# Patient Record
Sex: FEMALE | Race: WHITE | URBAN - METROPOLITAN AREA
[De-identification: names, ages, dates, MRNs, and addresses within clinical notes are randomized per-mention and may not be internally consistent; named-entity substitution may affect disease eponyms.]

---

## 2017-01-03 NOTE — PROGRESS NOTES
SUBJECTIVE:     CC: Patricia Gage is an 66 year old woman who presents for preventive health visit.     Healthy Habits:    Do you get at least three servings of calcium containing foods daily (dairy, green leafy vegetables, etc.)? yes    Amount of exercise or daily activities, outside of work: 4 day(s) per week    Problems taking medications regularly No    Medication side effects: No    Have you had an eye exam in the past two years? yes    Do you see a dentist twice per year? yes    Do you have sleep apnea, excessive snoring or daytime drowsiness?no        Diabetes Follow-up    Patient is checking blood sugars: 8-10 times daily.  Results are as follows:         170s    Diabetic concerns: None     Symptoms of hypoglycemia (low blood sugar): shaky, dizzy, weak     Paresthesias (numbness or burning in feet) or sores: No     Date of last diabetic eye exam: up to date       Depression and Anxiety Follow-Up    Status since last visit: No change    Other associated symptoms:None    Complicating factors:     Significant life event: No     Current substance abuse: None    PHQ-9 SCORE 2/20/2012   Total Score 1     MAGGIE-7 SCORE 1/9/2015 1/14/2016   Total Score 1 -   Total Score - 4        PHQ-9  English      PHQ-9   Any Language     GAD7       Migraine Follow-Up    Headaches symptoms:  Stable     Frequency: depending on stress     Duration of headaches: depends on pt takes meds    Able to do normal daily activities/work with migraines: Yes    Rescue/Relief medication:aspirin, fioricet              Effectiveness: aspirin minor, Fioricet total relief    Preventative medication: None    Neurologic complications: No new stroke-like symptoms, loss of vision or speech, numbness or weakness    In the past 4 weeks, how often have you gone to Urgent Care or the emergency room because of your headaches?  0     No concerns    Pain in right knee with kneeling on right knee lateral aspect.      DM- following with dr. Lassiter, a1c down  to 6.9.  Having low sugars- lowest 58, can feel them when they happen.  Seeing endo again in feb.      Anxiety:  Well controlled, feels more stressed about work.  Feels that meds are helping, doesn't want to change meds.     Migraine:  Occuring 2-3 x monthly.  meds help.      Today's PHQ-2 Score:   PHQ-2 ( 1999 Pf[employer]) 1/14/2016 1/9/2016   Q1: Little interest or pleasure in doing things 0 -   Q2: Feeling down, depressed or hopeless 0 -   PHQ-2 Score 0 -   Little interest or pleasure in doing things - Not at all   Feeling down, depressed or hopeless - Not at all   PHQ-2 Score - 0       Abuse: Current or Past(Physical, Sexual or Emotional)- NO  Do you feel safe in your environment - YES    Social History   Substance Use Topics     Smoking status: Former Smoker     Smokeless tobacco: Never Used     Alcohol Use: 0.0 oz/week     0 Standard drinks or equivalent per week      Comment: 5 drinks per week     The patient does not drink >3 drinks per day nor >7 drinks per week.    Recent Labs   Lab Test  12/29/16   0726  01/28/16   0716  01/07/15   0705   01/22/14   0701   CHOL  170  175  167   --   153   HDL  75  69  71   --   60   LDL  85  97  87   < >  82   TRIG  51  46  45   --   53   CHOLHDLRATIO   --    --   2.4   --   2.5   NHDL  95  106   --    --    --     < > = values in this interval not displayed.       Reviewed orders with patient.  Reviewed health maintenance and updated orders accordingly - Yes    Mammo Decision Support:  Patient over age 50, mutual decision to screen reflected in health maintenance.    Pertinent mammograms are reviewed under the imaging tab.  History of abnormal Pap smear: NO - age 30-65 PAP every 5 years with negative HPV co-testing recommended  All Histories reviewed and updated in Epic.  Past Medical History   Diagnosis Date     Diabetes mellitus (H)      Myocarditis, unspecified (H) 12/9/2011     at dx of type 1 DM, resolved after     Basal cell carcinoma       Past Surgical History  "  Procedure Laterality Date     Mohs micrographic procedure         ROS:  C: NEGATIVE for fever, chills, change in weight  I: NEGATIVE for worrisome rashes, moles or lesions  E: NEGATIVE for vision changes or irritation  ENT: NEGATIVE for ear, mouth and throat problems  R: NEGATIVE for significant cough or SOB  B: NEGATIVE for masses, tenderness or discharge  CV: NEGATIVE for chest pain, palpitations or peripheral edema  GI: NEGATIVE for nausea, abdominal pain, heartburn, or change in bowel habits  : NEGATIVE for unusual urinary or vaginal symptoms. No vaginal bleeding.  M: NEGATIVE for significant arthralgias or myalgia  N: NEGATIVE for weakness, dizziness or paresthesias  P: NEGATIVE for changes in mood or affect     Problem list, Medication list, Allergies, and Medical/Social/Surgical histories reviewed in [facility] and updated as appropriate.  OBJECTIVE:     /54 mmHg  Pulse 94  Temp(Src) 98.7  F (37.1  C) (Tympanic)  Ht 5' 5\" (1.651 m)  Wt 122 lb 2 oz (55.396 kg)  BMI 20.32 kg/m2  SpO2 97%  EXAM:  GENERAL: healthy, alert and no distress  EYES: Eyes grossly normal to inspection, PERRL and conjunctivae and sclerae normal  HENT: ear canals and TM's normal, nose and mouth without ulcers or lesions  NECK: no adenopathy, no asymmetry, masses, or scars and thyroid normal to palpation  RESP: lungs clear to auscultation - no rales, rhonchi or wheezes  BREAST: normal without masses, tenderness or nipple discharge and no palpable axillary masses or adenopathy  CV: regular rate and rhythm, normal S1 S2, no S3 or S4, no murmur, click or rub, no peripheral edema and peripheral pulses strong  ABDOMEN: soft, nontender, no hepatosplenomegaly, no masses and bowel sounds normal  MS: no gross musculoskeletal defects noted, no edema  SKIN: no suspicious lesions or rashes  NEURO: Normal strength and tone, mentation intact and speech normal  PSYCH: mentation appears normal, affect normal/bright    ASSESSMENT/PLAN:   " "  (Z01.419) Encounter for gynecological examination without abnormal finding  (primary encounter diagnosis)  -pap utd  -mammo utd  -imm utd  -colonoscopy utd    (E10.9) Type 1 diabetes mellitus without complication (H)  -a1c well controlled  -follows with diabetes    (F41.9) Anxiety  -well controlled  -doing well on meds, doesn't wish to change meds  Plan: FLUoxetine (PROZAC) 20 MG capsule, WELLBUTRIN          MG 12 hr tablet          (G43.009) Migraine without aura and without status migrainosus, not intractable  -doing well on fiorcet prn  -no change to medications  Plan: butalbital-acetaminophen-caffeine         (FIORICET/ESGIC) -40 MG per tablet          (K51.50) Left sided ulcerative (chronic) colitis (H)  -in remission  -following with gi     (Z72.0) Tobacco abuse  -still smoking small amounts  -encouraged smoking cessation     COUNSELING:   Reviewed preventive health counseling, as reflected in patient instructions       Regular exercise       Healthy diet/nutrition         reports that she has quit smoking. She has never used smokeless tobacco.    Estimated body mass index is 20.32 kg/(m^2) as calculated from the following:    Height as of this encounter: 5' 5\" (1.651 m).    Weight as of this encounter: 122 lb 2 oz (55.396 kg).       Counseling Resources:  ATP IV Guidelines  Pooled Cohorts Equation Calculator  Breast Cancer Risk Calculator  FRAX Risk Assessment  ICSI Preventive Guidelines  Dietary Guidelines for Americans, 2010  USDA's MyPlate  ASA Prophylaxis  Lung CA Screening    Jenniffer Molina MD  [facility] JOANN  "

## 2017-01-05 ENCOUNTER — OFFICE VISIT (OUTPATIENT)
Dept: PEDIATRICS | Facility: CLINIC | Age: 67
End: 2017-01-05
Payer: COMMERCIAL

## 2017-01-05 VITALS
TEMPERATURE: 98.7 F | OXYGEN SATURATION: 97 % | SYSTOLIC BLOOD PRESSURE: 110 MMHG | HEART RATE: 94 BPM | HEIGHT: 65 IN | DIASTOLIC BLOOD PRESSURE: 54 MMHG | WEIGHT: 122.13 LBS | BODY MASS INDEX: 20.35 KG/M2

## 2017-01-05 DIAGNOSIS — K51.50 LEFT SIDED ULCERATIVE (CHRONIC) COLITIS (H): ICD-10-CM

## 2017-01-05 DIAGNOSIS — G43.009 MIGRAINE WITHOUT AURA AND WITHOUT STATUS MIGRAINOSUS, NOT INTRACTABLE: ICD-10-CM

## 2017-01-05 DIAGNOSIS — F41.9 ANXIETY: ICD-10-CM

## 2017-01-05 DIAGNOSIS — Z72.0 TOBACCO ABUSE: ICD-10-CM

## 2017-01-05 DIAGNOSIS — E10.9 TYPE 1 DIABETES MELLITUS WITHOUT COMPLICATION (H): ICD-10-CM

## 2017-01-05 DIAGNOSIS — Z01.419 ENCOUNTER FOR GYNECOLOGICAL EXAMINATION WITHOUT ABNORMAL FINDING: Primary | ICD-10-CM

## 2017-01-05 PROCEDURE — 99396 PREV VISIT EST AGE 40-64: CPT | Performed by: INTERNAL MEDICINE

## 2017-01-05 RX ORDER — BUTALBITAL, ACETAMINOPHEN AND CAFFEINE 50; 325; 40 MG/1; MG/1; MG/1
1 TABLET ORAL EVERY 4 HOURS PRN
Qty: 30 TABLET | Refills: 5 | Status: SHIPPED | OUTPATIENT
Start: 2017-01-05 | End: 2018-01-11

## 2017-01-05 RX ORDER — BUPROPION HCL 150 MG
150 TABLET,SUSTAINED-RELEASE 12 HR ORAL 2 TIMES DAILY
Qty: 180 TABLET | Refills: 3 | Status: SHIPPED | OUTPATIENT
Start: 2017-01-05 | End: 2017-01-06

## 2017-01-05 ASSESSMENT — PATIENT HEALTH QUESTIONNAIRE - PHQ9: 5. POOR APPETITE OR OVEREATING: SEVERAL DAYS

## 2017-01-05 ASSESSMENT — ANXIETY QUESTIONNAIRES
2. NOT BEING ABLE TO STOP OR CONTROL WORRYING: NOT AT ALL
GAD7 TOTAL SCORE: 3
6. BECOMING EASILY ANNOYED OR IRRITABLE: NOT AT ALL
IF YOU CHECKED OFF ANY PROBLEMS ON THIS QUESTIONNAIRE, HOW DIFFICULT HAVE THESE PROBLEMS MADE IT FOR YOU TO DO YOUR WORK, TAKE CARE OF THINGS AT HOME, OR GET ALONG WITH OTHER PEOPLE: NOT DIFFICULT AT ALL
3. WORRYING TOO MUCH ABOUT DIFFERENT THINGS: SEVERAL DAYS
7. FEELING AFRAID AS IF SOMETHING AWFUL MIGHT HAPPEN: NOT AT ALL
1. FEELING NERVOUS, ANXIOUS, OR ON EDGE: NOT AT ALL
5. BEING SO RESTLESS THAT IT IS HARD TO SIT STILL: SEVERAL DAYS

## 2017-01-05 NOTE — NURSING NOTE
"Chief Complaint   Patient presents with     Physical       Initial /54 mmHg  Pulse 94  Temp(Src) 98.7  F (37.1  C) (Tympanic)  Ht 5' 5\" (1.651 m)  Wt 122 lb 2 oz (55.396 kg)  BMI 20.32 kg/m2  SpO2 97% Estimated body mass index is 20.32 kg/(m^2) as calculated from the following:    Height as of this encounter: 5' 5\" (1.651 m).    Weight as of this encounter: 122 lb 2 oz (55.396 kg).  BP completed using cuff size: regular  Giovanna ANIKET Jama      "

## 2017-01-05 NOTE — MR AVS SNAPSHOT
After Visit Summary   1/5/2017    Patricia Gage    MRN: [id number]           Patient Information     Date Of Birth          1950        Visit Information        Provider Department      1/5/2017 7:00 AM Jenniffer Molina MD [facility]        Today's Diagnoses     Encounter for gynecological examination without abnormal finding    -  1     Type 1 diabetes mellitus without complication (H)         Anxiety         Migraine without aura and without status migrainosus, not intractable         Left sided ulcerative (chronic) colitis (H)         Tobacco abuse           Care Instructions      Preventive Health Recommendations  Female Ages 50 - 64    Yearly exam: See your health care provider every year in order to  o Review health changes.   o Discuss preventive care.    o Review your medicines if your doctor has prescribed any.      Get a Pap test every three years (unless you have an abnormal result and your provider advises testing more often).    If you get Pap tests with HPV test, you only need to test every 5 years, unless you have an abnormal result.     You do not need a Pap test if your uterus was removed (hysterectomy) and you have not had cancer.    You should be tested each year for STDs (sexually transmitted diseases) if you're at risk.     Have a mammogram every 1 to 2 years.    Have a colonoscopy at age 50, or have a yearly FIT test (stool test). These exams screen for colon cancer.      Have a cholesterol test every 5 years, or more often if advised.    Have a diabetes test (fasting glucose) every three years. If you are at risk for diabetes, you should have this test more often.     If you are at risk for osteoporosis (brittle bone disease), think about having a bone density scan (DEXA).    Shots: Get a flu shot each year. Get a tetanus shot every 10 years.    Nutrition:     Eat at least 5 servings of fruits and vegetables each day.    Eat whole-grain bread,  whole-wheat pasta and brown rice instead of white grains and rice.    Talk to your provider about Calcium and Vitamin D.     Lifestyle    Exercise at least 150 minutes a week (30 minutes a day, 5 days a week). This will help you control your weight and prevent disease.    Limit alcohol to one drink per day.    No smoking.     Wear sunscreen to prevent skin cancer.     See your dentist every six months for an exam and cleaning.    See your eye doctor every 1 to 2 years.            Follow-ups after your visit        Your next 10 appointments already scheduled     Feb 07, 2017  4:00 PM   Return Visit with Tania Lang MD   [facility] ([facility])    [address] [place] [address]  [address]  [facility] [address] [phone]              Who to contact     If you have questions or need follow up information about today's clinic visit or your schedule please contact [facility] directly at [phone].  Normal or non-critical lab and imaging results will be communicated to you by SocialOptimizrhart, letter or phone within 4 business days after the clinic has received the results. If you do not hear from us within 7 days, please contact the clinic through [employer] or phone. If you have a critical or abnormal lab result, we will notify you by phone as soon as possible.  Submit refill requests through Vyykn or call your pharmacy and they will forward the refill request to us. Please allow 3 business days for your refill to be completed.          Additional Information About Your Visit        [employer] Information     Vyykn gives you secure access to your electronic health record. If you see a primary care provider, you can also send messages to your care team and make appointments. If you have questions, please call your primary care clinic.  If you do not have a primary care provider, please call [phone] and they will assist you.        Care EveryWhere ID     This is your Care  "EveryWhere ID. This could be used by other organizations to access your [facility] medical records  [id number]        Your Vitals Were     Pulse Temperature Height BMI (Body Mass Index) Pulse Oximetry       94 98.7  F (37.1  C) (Tympanic) 5' 5\" (1.651 m) 20.32 kg/m2 97%        Blood Pressure from Last 3 Encounters:   01/05/17 110/54   11/10/16 126/66   04/18/16 102/65    Weight from Last 3 Encounters:   01/05/17 122 lb 2 oz (55.396 kg)   11/10/16 122 lb 1 oz (55.367 kg)   04/18/16 118 lb (53.524 kg)              Today, you had the following     No orders found for display         Where to get your medicines      These medications were sent to [facility] [place] [address] [facility] [place]  [address] [facility] [place] [address]     Phone:  [phone]    - FLUoxetine 20 MG capsule  - WELLBUTRIN  MG 12 hr tablet      Some of these will need a paper prescription and others can be bought over the counter.  Ask your nurse if you have questions.     Bring a paper prescription for each of these medications    - butalbital-acetaminophen-caffeine -40 MG per tablet       Primary Care Provider Office Phone # Fax #    Jenniffer Molina -[phone]       [facility] [address] [place] [address]        Thank you!     Thank you for choosing [facility]  for your care. Our goal is always to provide you with excellent care. Hearing back from our patients is one way we can continue to improve our services. Please take a few minutes to complete the written survey that you may receive in the mail after your visit with us. Thank you!             Your Updated Medication List - Protect others around you: Learn how to safely use, store and throw away your medicines at www.disposemymeds.org.          This list is accurate as of: 1/5/17  7:40 AM.  Always use your most recent med list.                   Brand Name Dispense Instructions for use    ASACOL  MG " EC tablet   Generic drug:  mesalamine     180 tablet    3 tablets twice daily       aspirin 81 MG EC tablet     90 tablet    Take 1 tablet by mouth daily.       atorvastatin 40 MG tablet    LIPITOR    90 tablet    Take 1 tablet (40 mg) by mouth daily       blood glucose monitoring test strip    no brand specified     by In Vitro route 4 times daily.       butalbital-acetaminophen-caffeine -40 MG per tablet    FIORICET/ESGIC    30 tablet    Take 1 tablet by mouth every 4 hours as needed       estradiol 0.1 MG/GM cream    ESTRACE VAGINAL    30 g    Place 2 g vaginally twice a week       fluconazole 150 MG tablet    DIFLUCAN    12 tablet    Take 1 tablet (150 mg) by mouth every 7 days       FLUoxetine 20 MG capsule    PROzac    90 capsule    Take 1 capsule (20 mg) by mouth daily       insulin infusion pump device      100 units/carb unit. 50 units SQ continuous       lisinopril 5 MG tablet    PRINIVIL/ZESTRIL    45 tablet    Take 0.5 tablets (2.5 mg) by mouth daily       * nicotine 14 MG/24HR 24 hr patch    NICODERM CQ    30 patch    Place 1 patch onto the skin every 24 hours       * nicotine 7 MG/24HR 24 hr patch    NICODERM CQ    30 patch    Place 1 patch onto the skin every 24 hours       WELLBUTRIN  MG 12 hr tablet   Generic drug:  buPROPion     180 tablet    Take 1 tablet (150 mg) by mouth 2 times daily       * Notice:  This list has 2 medication(s) that are the same as other medications prescribed for you. Read the directions carefully, and ask your doctor or other care provider to review them with you.

## 2017-01-06 ENCOUNTER — TELEPHONE (OUTPATIENT)
Dept: PEDIATRICS | Facility: CLINIC | Age: 67
End: 2017-01-06

## 2017-01-06 DIAGNOSIS — F41.9 ANXIETY: Primary | ICD-10-CM

## 2017-01-06 RX ORDER — BUPROPION HYDROCHLORIDE 150 MG/1
150 TABLET, EXTENDED RELEASE ORAL 2 TIMES DAILY
Qty: 180 TABLET | Refills: 3
Start: 2017-01-06 | End: 2018-01-11

## 2017-01-06 ASSESSMENT — ANXIETY QUESTIONNAIRES: GAD7 TOTAL SCORE: 3

## 2017-01-06 NOTE — TELEPHONE ENCOUNTER
Pharmacy calling that Wellbutrin states ARIANNE but patient states that it should be generic. Spoke with Dr. Molina and she ok'd for generic.

## 2017-01-24 ENCOUNTER — MYC MEDICAL ADVICE (OUTPATIENT)
Dept: PEDIATRICS | Facility: CLINIC | Age: 67
End: 2017-01-24

## 2017-01-24 DIAGNOSIS — K51.50 LEFT SIDED ULCERATIVE (CHRONIC) COLITIS (H): Primary | ICD-10-CM

## 2017-02-07 ENCOUNTER — OFFICE VISIT (OUTPATIENT)
Dept: ENDOCRINOLOGY | Facility: CLINIC | Age: 67
End: 2017-02-07
Payer: COMMERCIAL

## 2017-02-07 ENCOUNTER — TELEPHONE (OUTPATIENT)
Dept: EDUCATION SERVICES | Facility: CLINIC | Age: 67
End: 2017-02-07

## 2017-02-07 VITALS
OXYGEN SATURATION: 98 % | HEART RATE: 68 BPM | WEIGHT: 136 LBS | BODY MASS INDEX: 22.63 KG/M2 | DIASTOLIC BLOOD PRESSURE: 60 MMHG | SYSTOLIC BLOOD PRESSURE: 128 MMHG

## 2017-02-07 DIAGNOSIS — E10.9 TYPE 1 DIABETES MELLITUS WITHOUT COMPLICATION (H): Primary | ICD-10-CM

## 2017-02-07 PROCEDURE — 99214 OFFICE O/P EST MOD 30 MIN: CPT | Performed by: INTERNAL MEDICINE

## 2017-02-07 RX ORDER — SUBCUTANEOUS INSULIN PUMP
EACH MISCELLANEOUS
Status: CANCELLED | OUTPATIENT
Start: 2017-02-07

## 2017-02-07 NOTE — MR AVS SNAPSHOT
After Visit Summary   2017    Patricia Gage    MRN: [id number]           Patient Information     Date Of Birth          1950        Visit Information        Provider Department      2017 4:00 PM Tania Lang MD [facility]        Today's Diagnoses     Type 1 diabetes mellitus without complication (H)    -  1       Care Instructions    [facility]   Dr Lang, Endocrinology Department      [facility]   [address] [place] [address] [place] [address]  Appointment [phone]  Fax: [phone]   Monday and Tuesday         [facility]   [address] [place] [address]  Appointment [phone]  Fax: [phone]  Wednesday and Thursday           Labs in 3 months  Please make a lab appointment for blood work and follow up clinic appointment in 1 week after that to discuss results.    Pump settings changes as discussed.        Follow-ups after your visit        Your next 10 appointments already scheduled     2017  8:30 AM   Diabetic Education with [place] DIABETIC ED RESOURCE   [facility]    [address]  [facility] [id number] [phone]            Mar 14, 2017  3:45 PM   New Visit with Emiliana Doshi MD   [facility]    [address] [place] [address]  [address]  [facility] [address] [phone]              Who to contact     If you have questions or need follow up information about today's clinic visit or your schedule please contact [facility] directly at [phone].  Normal or non-critical lab and imaging results will be communicated to you by MyChart, letter or phone within 4 business days after the clinic has received the results. If you do not hear from us within 7 days, please contact the clinic through MyChart or phone. If  you have a critical or abnormal lab result, we will notify you by phone as soon as possible.  Submit refill requests through [employer]Lynes or call your pharmacy and they will forward the refill request to us. Please allow 3 business days for your refill to be completed.          Additional Information About Your Visit        [employer] Information     TimeLynes gives you secure access to your electronic health record. If you see a primary care provider, you can also send messages to your care team and make appointments. If you have questions, please call your primary care clinic.  If you do not have a primary care provider, please call [phone] and they will assist you.        Care EveryWhere ID     This is your Care EveryWhere ID. This could be used by other organizations to access your [facility] medical records  [id number]        Your Vitals Were     Pulse Pulse Oximetry                68 98%           Blood Pressure from Last 3 Encounters:   02/07/17 128/60   01/05/17 110/54   11/10/16 126/66    Weight from Last 3 Encounters:   02/07/17 136 lb (61.689 kg)   01/05/17 122 lb 2 oz (55.396 kg)   11/10/16 122 lb 1 oz (55.367 kg)              Today, you had the following     No orders found for display         Today's Medication Changes          These changes are accurate as of: 2/7/17  4:42 PM.  If you have any questions, ask your nurse or doctor.               Start taking these medicines.        Dose/Directions    insulin aspart 100 UNITS/ML injection   Commonly known as:  NovoLOG VIAL   Used for:  Type 1 diabetes mellitus without complication (H)   Started by:  Tania Lang MD        About 35 units/day in insulin pump   Quantity:  30 mL   Refills:  3         These medicines have changed or have updated prescriptions.        Dose/Directions    blood glucose monitoring test strip   Commonly known as:  no brand specified   This may have changed:    - how to take this  - when to take this  - additional instructions    Used for:  Type 1 diabetes mellitus without complication (H)   Changed by:  Tania Lang MD        Please dispense glucometer compatible strips. Check 8 times a day   Quantity:  300 each   Refills:  3            Where to get your medicines      These medications were sent to [facility] [place] [address] [facility]  [address] [address] [facility] [address] [address]     Phone:  [phone]    - blood glucose monitoring test strip  - insulin aspart 100 UNITS/ML injection             Primary Care Provider Office Phone # Fax #    Jenniffer Molina [phone]       [facility] [address] [place] [address]        Thank you!     Thank you for choosing [facility]  for your care. Our goal is always to provide you with excellent care. Hearing back from our patients is one way we can continue to improve our services. Please take a few minutes to complete the written survey that you may receive in the mail after your visit with us. Thank you!             Your Updated Medication List - Protect others around you: Learn how to safely use, store and throw away your medicines at www.disposemymeds.org.          This list is accurate as of: 2/7/17  4:42 PM.  Always use your most recent med list.                   Brand Name Dispense Instructions for use    ASACOL  MG EC tablet   Generic drug:  mesalamine     180 tablet    3 tablets twice daily       aspirin 81 MG EC tablet     90 tablet    Take 1 tablet by mouth daily.       atorvastatin 40 MG tablet    LIPITOR    90 tablet    Take 1 tablet (40 mg) by mouth daily       blood glucose monitoring test strip    no brand specified    300 each    Please dispense glucometer compatible strips. Check 8 times a day       buPROPion 150 MG 12 hr tablet    WELLBUTRIN SR    180 tablet    Take 1 tablet (150 mg) by mouth 2 times daily       butalbital-acetaminophen-caffeine -40 MG per tablet    FIORICET/ESGIC    30  tablet    Take 1 tablet by mouth every 4 hours as needed       estradiol 0.1 MG/GM cream    ESTRACE VAGINAL    30 g    Place 2 g vaginally twice a week       fluconazole 150 MG tablet    DIFLUCAN    12 tablet    Take 1 tablet (150 mg) by mouth every 7 days       FLUoxetine 20 MG capsule    PROzac    90 capsule    Take 1 capsule (20 mg) by mouth daily       insulin aspart 100 UNITS/ML injection    NovoLOG VIAL    30 mL    About 35 units/day in insulin pump       insulin infusion pump device      100 units/carb unit. 50 units SQ continuous       lisinopril 5 MG tablet    PRINIVIL/ZESTRIL    45 tablet    Take 0.5 tablets (2.5 mg) by mouth daily       * nicotine 14 MG/24HR 24 hr patch    NICODERM CQ    30 patch    Place 1 patch onto the skin every 24 hours       * nicotine 7 MG/24HR 24 hr patch    NICODERM CQ    30 patch    Place 1 patch onto the skin every 24 hours       * Notice:  This list has 2 medication(s) that are the same as other medications prescribed for you. Read the directions carefully, and ask your doctor or other care provider to review them with you.

## 2017-02-07 NOTE — PATIENT INSTRUCTIONS
[facility]   Dr Lang, Endocrinology Department      [facility]   [address] [address]  Appointment [phone]  Fax: [phone]   Monday and Tuesday         [place] [address] [place] [address]  Appointment [phone]  Fax: [phone]  Wednesday and Thursday           Labs in 3 months  Please make a lab appointment for blood work and follow up clinic appointment in 1 week after that to discuss results.    Pump settings changes as discussed.

## 2017-02-07 NOTE — PROGRESS NOTES
"Chief Complaint   Patient presents with     Diabetes       Initial /60 mmHg  Pulse 68  Wt 136 lb (61.689 kg)  SpO2 98% Estimated body mass index is 22.63 kg/(m^2) as calculated from the following:    Height as of 1/5/17: 5' 5\" (1.651 m).    Weight as of this encounter: 136 lb (61.689 kg).  BP completed using cuff size: regular      Dominique Johsnon MA    "

## 2017-02-07 NOTE — TELEPHONE ENCOUNTER
Patient is scheduled to see diabetes education on 2/9/17, however does not currently have a referral in place. Please fill out and sign pended order if you are in agreement with this appointment.    Thanks!    Lorraine Babin RD LD CDE

## 2017-02-08 ENCOUNTER — TELEPHONE (OUTPATIENT)
Dept: EDUCATION SERVICES | Facility: CLINIC | Age: 67
End: 2017-02-08

## 2017-02-08 DIAGNOSIS — E10.9 TYPE 1 DIABETES MELLITUS WITHOUT COMPLICATION (H): Primary | ICD-10-CM

## 2017-02-08 NOTE — TELEPHONE ENCOUNTER
Patient is scheduled to see diabetes education 2/9/17, however does not have a current referral in place. Please sign pended order if you are in agreement with this appointment.    Lorraine Babin RD LD CDE

## 2017-02-08 NOTE — PROGRESS NOTES
Name: Patricia Gage  Seen for Diabetes.  BERTIN .  Last visit with her 4/2016  HPI:  Patricia Gage is a 65 year old female who presents for the evaluation/management of type 1 diabetes.  A1C      6.9   12/29/2016  A1C      7.5   11/14/2016  Will be switching to new pump.     1. Type 1 DM:  Orginally diagnosed at the age of 60 ( 10/2011).  DKA at diagnosis, sick at the time with vomiting and disorientation,, glucose of 800.Started on lantus and novolog, switched to insulin pump therapy in 2/12  Current Regimen:Humalog via Minimed Paradigm Revel 523 insulin pump  BS checks:8-10 per day  Average Meter Download: 184. BG variable. FBgs mostly OK. BG tend to rise during day. be  Followed with Dr Stover , switching due to insurance change. Doing well, comfortable with pump, though due for upgrade,has had current pump for 4 years.  H/o Ulcerative colitis, former smoker.  Here for f/u, doing well.    Current Regimen: mostly in standard pattern. On days when she has wine she switch to pattern A and when on prednisone for UC switch to pattern B.  Insulin pump -   Time Rate (U/hr)   7454-2642 0.475   7611-5758 0.850   8445-3084 0.900   7514-8575 0.775   9096-1140 0.050   5612-7412 0.300     Carbohydrate Ratio -    Time Ratio   0517-0157 14   6961-3687 12   2119-0678 16   8687-2291 15     Sensitivity   58   Active Insulin Time  4 hours   Basal  44 %   Bolus   55 %   Total Carbohydrates/day  179   Total Insulin/day   25   Average Blood Sugar  168   BS Checks 9 times a day   Care Link Username-   Password-         Complications:   Diabetes Complications  Description / Detail    Diabetic Retinopathy  No   CAD / PAD  No   Neuropathy  No   Nephropathy / Microalbuminuria  No   Gastroparesis  No   Hypoglycemia [place]  No     2. Hypertension: Blood Pressure today:   BP Readings from Last 3 Encounters:   02/07/17 128/60   01/05/17 110/54   11/10/16 126/66    Takes medications everyday without forgetting a dose.  Denies feeling  lightheaded or dizzy.  Wakes up 0 times a night to urinate.  3. Hyperlipidemia:  Denies muscle aches of pains.       PMH/PSH:  Past Medical History   Diagnosis Date     Diabetes mellitus (H)      Myocarditis, unspecified (H) 2011     at dx of type 1 DM, resolved after     Basal cell carcinoma      Past Surgical History   Procedure Laterality Date     Mohs micrographic procedure       Family Hx:  Family History   Problem Relation Age of Onset     Cardiovascular Father      father  of heart failure     CANCER No family hx of      no skin cancer     Other - See Comments Sister      Tourettes     GASTROINTESTINAL DISEASE Sister      Celiac Sprue       Social Hx:  Social History     Social History     Marital Status:      Spouse Name: N/A     Number of Children: N/A     Years of Education: N/A     Occupational History     Not on file.     Social History Main Topics     Smoking status: Former Smoker     Smokeless tobacco: Never Used     Alcohol Use: 0.0 oz/week     0 Standard drinks or equivalent per week      Comment: 5 drinks per week     Drug Use: No     Sexual Activity:     Partners: Male     Birth Control/ Protection: Surgical     Other Topics Concern     Not on file     Social History Narrative          MEDICATIONS:  has a current medication list which includes the following prescription(s): blood glucose monitoring, insulin aspart, bupropion, fluoxetine, butalbital-acetaminophen-caffeine, nicotine, nicotine, lisinopril, atorvastatin, estradiol, fluconazole, mesalamine, insulin infusion pump, and aspirin.    ROS     ROS: 10 point ROS neg other than the symptoms noted above in the HPI.    Physical Exam   VS: /60 mmHg  Pulse 68  Wt 136 lb (61.689 kg)  SpO2 98%  GENERAL: AXOX3, NAD, well dressed, answering questions appropriately, appears stated age.  HEENT: OP clear, no LAD, no TM, non-tender, no exopthalmous, no proptosis, EOMI, no lig lag, no retraction  NECK: palpable thyroid, no  nodules.  CV: RRR, no rubs, gallops, no murmurs  LUNGS: CTAB, no wheezes, rales, or ronchi  ABDOMEN: soft, nontender, nondistended, +BS, no organomegaly  EXTREMITIES: no edema, +pulses, no rashes, no lesions  NEUROLOGY: CN grossly intact, + DTR upper and lower extremity, no tremors  MSK: grossly intact  SKIN: no rashes, no lesions    LABS:  A1c:  A1C      6.9   12/29/2016  A1C      7.5   11/14/2016  A1C      7.6   4/18/2016  A1C      7.1   1/12/2016  A1C      7.6   10/14/2015      BMP:  Diabetic Latest Ref Rng 1/28/2016   Glucose 70 - 99 mg/dL 136 (H)   Potassium 3.4 - 5.3 mmol/L 3.8   Sodium 133 - 144 mmol/L 140   CREATININE 0.52 - 1.04 mg/dL 0.48 (L)     Urine Micro:  Diabetic Latest Ref Rng 1/12/2016   Microalb/creat urine 0 - 25 mg/g Cr 7.30     Diabetic Latest Ref Rng 1/7/2015   Microalb/creat urine 0 - 25 mg/g Cr 7.46       LFTs/Lipids:  Diabetic Latest Ref Rng 1/28/2016   AST (liver test) 0 - 45 U/L 16   ALT (liver test) 0 - 50 U/L 22   Cholesterol <200 mg/dL 175   Triglyceride (fat in blood) <150 mg/dL 46   HDL (>45) - good chol >49 mg/dL 69   LDL (<100) - bad chol <100 mg/dL 97           Blood Glucose Meter reviewed.     All pertinent notes, labs, and images personally reviewed by me.     A/P  Ms.Ann ADAN Gage is a 65 year old here for the evaluation/management of diabetes:    1. DM1 - Under Fair control. No known complications.  A1c 6.9 %. BG imroving. No major episodes of hypoglycemia. Tend to rise after dinner.  Basal < bolus.  Recommend checking blood sugars before meals and 2 hours after meals.  Urine microalbumin: wnl  Labs in 3 months  Discussed hypoglycemia signs and symptoms as well as management in detail.  If Blood glucose are low more often-> 2-3 times/week- give us a call.      Insulin pump - changes today    Time Rate (U/hr)   2344-7363 0.475   2882-8206 0.850   0766-2517 0.90-- 0.850   5757-7905 0.775-- 0.825   2961-6148 0.050--0.10   6304-8423 0.300--0.40     Carbohydrate Ratio -    Time  Ratio   4841-6250 14   2153-8811 12   3098-3507 16--15   5666-9729 15--13       Sensitivity   58      Prevention  Flu Shot-9/15  Pneumovax- 1/15  Opthalmology-Yes, 4/16  Dental-Yes  ASA-Yes  Smoking- No, former smoker    2. Hypertension - Under Good  control. Blood pressure medications include lisinopril 5 mg taking for renal protection. Continue current medical therapy.    3. Hyperlipidemia - Under Good control. ON  lipitor 40 mg.  -- recheck lipid panel in next visit    4. Other.  Patients with Type 1 DM are at risk for other autoimmune diseases.  She is at increase risk for thyroid disorder by 20% and increase risk for Celiac Sprue by 5-10%. TFT' are normal. Celiac sprue  Testing: negative.    Meds ordered today:   Orders Placed This Encounter   Medications     blood glucose monitoring (NO BRAND SPECIFIED) test strip     Sig: Please dispense glucometer compatible strips. Check 8 times a day     Dispense:  300 each     Refill:  3     insulin aspart (NOVOLOG VIAL) 100 UNITS/ML injection     Sig: About 35 units/day in insulin pump     Dispense:  30 mL     Refill:  3       Medications Discontinued During This Encounter   Medication Reason     glucose blood VI test strips strip Reorder       Follow-up:  follow up in 3 month(s)    Tania Lang MD  Endocrinology   [facility]  CC: Jenniffer Molina    More than 50% of face to face time spent with Ms. Gage on counseling / coordinating her care.  Total face to face time was greater than 25 minutes.     All questions were answered.  The patient indicates understanding of the above issues and agrees with the plan set forth.

## 2017-02-09 ENCOUNTER — ALLIED HEALTH/NURSE VISIT (OUTPATIENT)
Dept: EDUCATION SERVICES | Facility: CLINIC | Age: 67
End: 2017-02-09
Payer: COMMERCIAL

## 2017-02-09 DIAGNOSIS — E10.9 TYPE 1 DIABETES MELLITUS WITHOUT COMPLICATION (H): Primary | ICD-10-CM

## 2017-02-09 PROCEDURE — G0108 DIAB MANAGE TRN  PER INDIV: HCPCS

## 2017-02-09 NOTE — PROGRESS NOTES
Diabetes Self Management Training: Insulin Pump Start (Upgrade Training)    SUBJECTIVE:  Patient presents for an upgrade insulin pump start.   Pump Type: Medtronic 630G    OBJECTIVE:  Vitals: There were no vitals taken for this visit.    Blood sugar at the end of pump start appointment: 143 mg/dL      ASSESSMENT / INTERVENTION:  Patient instructed on basic insulin pump topics and insulin pump programming.     Insulin pump was programmed according to the Pump Start Orders signed by MD and attached to this encounter: BG Targets: Overnight 90 - 120 mg/dL from 9 pm - 5 am and Daytime 85 - 130 mg/dL from 5 am - 9 pm  Active Insulin Time: 4 hours  Dual wave/Square wave Bolus: off  Time  Rate (U/hr)    7310-8868  0.475    2867-9199  0.850    2072-7780  0.850    2291-3448  0.825    1515-0575  0.10    7474-5598  0.40      Carbohydrate Ratio -    Time  Ratio    5985-0030  14    9615-3319  12    3728-8171  15    8027-1948  13          Sensitivity    58                Infusion set: Medtronic Silhouette    Problem Solving: high blood sugars and DKA prevention  when to call the pump company help line     Education materials provided to patient: patient has the Medtronic 630G pump user learning guides    FOLLOW-UP:  Follow-up to start sensor in 1-2 weeks.    ZI Navas CDE    Time Spent: 60  Visit Type: Individual

## 2017-02-17 ENCOUNTER — MYC MEDICAL ADVICE (OUTPATIENT)
Dept: ENDOCRINOLOGY | Facility: CLINIC | Age: 67
End: 2017-02-17

## 2017-02-17 DIAGNOSIS — E10.9 TYPE 1 DIABETES MELLITUS WITHOUT COMPLICATION (H): Primary | ICD-10-CM

## 2017-02-21 NOTE — TELEPHONE ENCOUNTER
Can you please see how can we order this? Is it paperwork or through e-prescribe? Or through medtronic?  In  order we need to put exact details in orders.  Thank you.

## 2017-02-22 RX ORDER — INSULIN PUMP SYRINGE, 1.8 ML
1 EACH MISCELLANEOUS DAILY PRN
Qty: 30 EACH | Refills: 1 | Status: SHIPPED | OUTPATIENT
Start: 2017-02-22

## 2017-02-22 NOTE — TELEPHONE ENCOUNTER
Order pending for insulin pump supplies.  Routed to Dr. Lang for signature.    Yasmeen Weller RD LD CDE

## 2017-02-27 ENCOUNTER — MYC MEDICAL ADVICE (OUTPATIENT)
Dept: PEDIATRICS | Facility: CLINIC | Age: 67
End: 2017-02-27

## 2017-02-27 DIAGNOSIS — E10.9 TYPE 1 DIABETES MELLITUS WITHOUT COMPLICATION (H): ICD-10-CM

## 2017-02-27 RX ORDER — LISINOPRIL 5 MG/1
2.5 TABLET ORAL DAILY
Qty: 45 TABLET | Refills: 3 | Status: SHIPPED | OUTPATIENT
Start: 2017-02-27 | End: 2018-01-11

## 2017-02-27 NOTE — TELEPHONE ENCOUNTER
Lisinopril 5 mg      Last Written Prescription Date: 04/04/16  Last Fill Quantity: 45, # refills: 2  Last Office Visit with [facility] or [facility] prescribing provider: 01/05/17  Associated diagnosis is diabetes.  Patient doesn't have hypertension on her problem list.     Potassium   Date Value Ref Range Status   12/29/2016 4.0 3.4 - 5.3 mmol/L Final     Creatinine   Date Value Ref Range Status   12/29/2016 0.55 0.52 - 1.04 mg/dL Final     BP Readings from Last 3 Encounters:   02/07/17 128/60   01/05/17 110/54   11/10/16 126/66   Routing refill request to provider for review/approval because:  Due to related diagnosis.  DAO Ford RN

## 2017-03-09 ENCOUNTER — MYC MEDICAL ADVICE (OUTPATIENT)
Dept: ENDOCRINOLOGY | Facility: CLINIC | Age: 67
End: 2017-03-09

## 2017-03-09 DIAGNOSIS — E10.9 TYPE 1 DIABETES MELLITUS WITHOUT COMPLICATION (H): Primary | ICD-10-CM

## 2017-03-09 NOTE — TELEPHONE ENCOUNTER
Can try PA. If not approved then needs a new meter.  Did we get paperwork earlier as she is reporting in mail?

## 2017-03-13 DIAGNOSIS — Z72.0 TOBACCO ABUSE: ICD-10-CM

## 2017-03-13 NOTE — TELEPHONE ENCOUNTER
NICOTINE 14MG/24H PATCH     Last Written Prescription Date: 6/15/2016  Last Fill Quantity: 30, # refills: 3  Last Office Visit with [facility], [facility] or [facility] prescribing provider: 1/5/2017        BP Readings from Last 3 Encounters:   02/07/17 128/60   01/05/17 110/54   11/10/16 126/66

## 2017-03-14 ENCOUNTER — TELEPHONE (OUTPATIENT)
Dept: ENDOCRINOLOGY | Facility: CLINIC | Age: 67
End: 2017-03-14

## 2017-03-14 NOTE — TELEPHONE ENCOUNTER
There's a telephone encounter on 3/9 for this, Lanny is working on this per PA rep at [facility].Giovanna Jama, CMA

## 2017-03-16 RX ORDER — NICOTINE 21 MG/24HR
1 PATCH, TRANSDERMAL 24 HOURS TRANSDERMAL EVERY 24 HOURS
Qty: 30 PATCH | Refills: 3 | Status: SHIPPED | OUTPATIENT
Start: 2017-03-16

## 2017-03-16 NOTE — TELEPHONE ENCOUNTER
Patient is ok with doing Formulary Exception, she recalls needing to do this every year.     Please keep an eye on the result for this. If needing, ok to call insurance number below to follow up on status. I did temitope it urgent so it should result within 72 hours. Patient will also call her insurance.

## 2017-03-16 NOTE — TELEPHONE ENCOUNTER
I apologize patient plans to get a new meter, but through Medtronic not through a local pharmacy. She is needing these supplies in the interim.      Called [facility], we would need to fill out a Formulary Exception Form, which cannot be done over the phone.     I'm unsure if the patient would want to switch now or if she'd like to go ahead with the Formulary Exception Form, so I will complete this and fax back to the insurance. I will keep a copy in Dr. Lang's [facility] here at [place] on her desk.     I did leave a voicemail updating the patient and asked her to call back if she'd like to get a new meter & supplies now.     **Please keep encounter open in Endo Pool to follow up to see if the patient is switching over or wanting formulary exception. Patient likely will need a whole new meter versus just test strips, so we may need to send a meter if patient is wanting to switch now.

## 2017-03-16 NOTE — TELEPHONE ENCOUNTER
Patient calling, [facility] did not receive the PA info from us. She is going to run out of test strips soon. Classiqs Contour Strips.     Routing to [employer]. Can we call insurance to do PA over the phone asap?     Ins# [phone]  ID# [id number]    Alyssa Rodriguez RN  [facility]

## 2017-03-17 NOTE — TELEPHONE ENCOUNTER
When I called [employer] I was told a formulary exception would be denied - I was told switching meters and testing strips would allow her scripts to be on the formulary.     Blanca Baeza CMA    3/17/2017  6:55 AM

## 2017-03-27 ENCOUNTER — TRANSFERRED RECORDS (OUTPATIENT)
Dept: HEALTH INFORMATION MANAGEMENT | Facility: CLINIC | Age: 67
End: 2017-03-27

## 2017-03-31 ENCOUNTER — TRANSFERRED RECORDS (OUTPATIENT)
Dept: HEALTH INFORMATION MANAGEMENT | Facility: CLINIC | Age: 67
End: 2017-03-31

## 2017-04-04 ENCOUNTER — TELEPHONE (OUTPATIENT)
Dept: DERMATOLOGY | Facility: CLINIC | Age: 67
End: 2017-04-04

## 2017-04-04 NOTE — TELEPHONE ENCOUNTER
LVM to call clinic. Please help patient reschedule her 5/16 appointment with Dr Doshi.    Brooklynn Stevenson,   [facility]

## 2017-05-08 ENCOUNTER — OFFICE VISIT (OUTPATIENT)
Dept: ENDOCRINOLOGY | Facility: CLINIC | Age: 67
End: 2017-05-08
Payer: COMMERCIAL

## 2017-05-08 VITALS
DIASTOLIC BLOOD PRESSURE: 70 MMHG | HEIGHT: 65 IN | SYSTOLIC BLOOD PRESSURE: 110 MMHG | WEIGHT: 121 LBS | BODY MASS INDEX: 20.16 KG/M2

## 2017-05-08 DIAGNOSIS — Z79.4 LONG TERM CURRENT USE OF INSULIN (H): ICD-10-CM

## 2017-05-08 DIAGNOSIS — E10.9 TYPE 1 DIABETES MELLITUS WITHOUT COMPLICATION (H): Primary | ICD-10-CM

## 2017-05-08 LAB — HBA1C MFR BLD: 7.1 % (ref 4.3–6)

## 2017-05-08 PROCEDURE — 83036 HEMOGLOBIN GLYCOSYLATED A1C: CPT | Performed by: INTERNAL MEDICINE

## 2017-05-08 PROCEDURE — 99214 OFFICE O/P EST MOD 30 MIN: CPT | Performed by: INTERNAL MEDICINE

## 2017-05-08 NOTE — MR AVS SNAPSHOT
After Visit Summary   2017    Patricia Gage    MRN: [id number]           Patient Information     Date Of Birth          1950        Visit Information        Provider Department      2017 4:00 PM Tania Lang MD [facility]        Today's Diagnoses     Type 1 diabetes mellitus without complication (H)          Care Instructions    [facility]   Dr Lang, Endocrinology Department      [facility]   [address] [place] [address]  Appointment [phone]  Fax: [phone]   Monday and Tuesday         [place] [address] [facility] [place] [place] [address]  Appointment [phone]  Fax: [phone]  Wednesday and Thursday           To provide the best diabetic care, please bring your blood glucose meter to each and every visit with your Endocrinologist.  Your blood glucose meter/insulin pump will be downloaded at every appointment.      Please arrive 15 minutes before your scheduled appointment.  This will allow for your blood glucose meter/insulin pump to be downloaded and glycosylated hemoglobin (A1c) to be obtained prior to your appointment.    Labs today  Labs in 3 months  Please make a lab appointment for blood work and follow up clinic appointment in 1 week after that to discuss results.            Follow-ups after your visit        Your next 10 appointments already scheduled     2017  3:45 PM CDT   New Visit with Emiliana Doshi MD   [facility] ([facility])    [address] [place] [address]  [address]  [place] [id number] [phone]            Aug 08, 2017  4:00 PM CDT   Return Visit with Tania Lang MD   [facility] ([facility])    [address] [place] [address]  [address]  [place] [id number] [phone]              Who to contact     If you have questions or need  "follow up information about today's clinic visit or your schedule please contact [facility] directly at [phone].  Normal or non-critical lab and imaging results will be communicated to you by MyChart, letter or phone within 4 business days after the clinic has received the results. If you do not hear from us within 7 days, please contact the clinic through [employer] or phone. If you have a critical or abnormal lab result, we will notify you by phone as soon as possible.  Submit refill requests through [employer] or call your pharmacy and they will forward the refill request to us. Please allow 3 business days for your refill to be completed.          Additional Information About Your Visit        [employer] Information     [employer] gives you secure access to your electronic health record. If you see a primary care provider, you can also send messages to your care team and make appointments. If you have questions, please call your primary care clinic.  If you do not have a primary care provider, please call [phone] and they will assist you.        Care EveryWhere ID     This is your Care EveryWhere ID. This could be used by other organizations to access your [place] medical records  [id number]        Your Vitals Were     Height BMI (Body Mass Index)                1.651 m (5' 5\") 20.14 kg/m2           Blood Pressure from Last 3 Encounters:   05/08/17 110/70   02/07/17 128/60   01/05/17 110/54    Weight from Last 3 Encounters:   05/08/17 54.9 kg (121 lb)   02/07/17 61.7 kg (136 lb)   01/05/17 55.4 kg (122 lb 2 oz)              We Performed the Following     Hemoglobin A1c     Hemoglobin A1c        Primary Care Provider Office Phone # Fax #    Jenniffer Molina [phone]       [facility] [address] [place] [address] [address] [place] [address]        Thank you!     Thank you for choosing [facility]  for your care. Our goal is always to provide you with excellent " care. Hearing back from our patients is one way we can continue to improve our services. Please take a few minutes to complete the written survey that you may receive in the mail after your visit with us. Thank you!             Your Updated Medication List - Protect others around you: Learn how to safely use, store and throw away your medicines at www.disposemymeds.org.          This list is accurate as of: 5/8/17  4:43 PM.  Always use your most recent med list.                   Brand Name Dispense Instructions for use    ASACOL  MG EC tablet   Generic drug:  mesalamine     180 tablet    3 tablets twice daily       aspirin 81 MG EC tablet     90 tablet    Take 1 tablet by mouth daily.       * blood glucose monitoring test strip    no brand specified    300 each    Please dispense glucometer compatible strips. Check 8 times a day       * blood glucose monitoring test strip    no brand specified    300 strip    Use to test blood sugars 7 times daily or as directed       buPROPion 150 MG 12 hr tablet    WELLBUTRIN SR    180 tablet    Take 1 tablet (150 mg) by mouth 2 times daily       butalbital-acetaminophen-caffeine -40 MG per tablet    FIORICET/ESGIC    30 tablet    Take 1 tablet by mouth every 4 hours as needed       estradiol 0.1 MG/GM cream    ESTRACE VAGINAL    30 g    Place 2 g vaginally twice a week       fluconazole 150 MG tablet    DIFLUCAN    12 tablet    Take 1 tablet (150 mg) by mouth every 7 days       FLUoxetine 20 MG capsule    PROzac    90 capsule    Take 1 capsule (20 mg) by mouth daily       insulin aspart 100 UNITS/ML injection    NovoLOG VIAL    30 mL    About 35 units/day in insulin pump       insulin infusion pump device      100 units/carb unit. 50 units SQ continuous       lisinopril 5 MG tablet    PRINIVIL/ZESTRIL    45 tablet    Take 0.5 tablets (2.5 mg) by mouth daily       MINIMED RESERVOIR 1.8ML Misc     30 each    1 each daily as needed Change insulin reservoir, set and site  every 2-3 days       * nicotine 7 MG/24HR 24 hr patch    NICODERM CQ    30 patch    Place 1 patch onto the skin every 24 hours       * nicotine 14 MG/24HR 24 hr patch    NICODERM CQ    30 patch    Place 1 patch onto the skin every 24 hours       * Notice:  This list has 4 medication(s) that are the same as other medications prescribed for you. Read the directions carefully, and ask your doctor or other care provider to review them with you.

## 2017-05-08 NOTE — PATIENT INSTRUCTIONS
[facility]   Dr Lang, Endocrinology Department      [facility]   [address] [address]  Appointment [phone]  Fax: [phone]   Monday and Tuesday         [facility]   [address] [facility] [place] [address]  Appointment [phone]  Fax: [phone]  Wednesday and Thursday           To provide the best diabetic care, please bring your blood glucose meter to each and every visit with your Endocrinologist.  Your blood glucose meter/insulin pump will be downloaded at every appointment.      Please arrive 15 minutes before your scheduled appointment.  This will allow for your blood glucose meter/insulin pump to be downloaded and glycosylated hemoglobin (A1c) to be obtained prior to your appointment.    Labs today  Labs in 3 months  Please make a lab appointment for blood work and follow up clinic appointment in 1 week after that to discuss results.

## 2017-05-08 NOTE — PROGRESS NOTES
Name: Patricia Gage  Seen for Diabetes.  BERTIN .  Last visit with her 4/2016  HPI:  Patricia Gage is a 65 year old female who presents for the evaluation/management of type 1 diabetes.  A1C      6.9   12/29/2016  A1C      7.5   11/14/2016  On 630 G- though not using sensors ( does not like adhesive)    1. Type 1 DM:  Orginally diagnosed at the age of 60 ( 10/2011).  DKA at diagnosis, sick at the time with vomiting and disorientation,, glucose of 800.Started on lantus and novolog, switched to insulin pump therapy in 2/12  Current Regimen:Humalog via 630 G insulin pump  BS checks: 8-10 per day  Average Meter Download: 151. BG variable. FBgs mostly OK. Few episodes of low Bg between 2:00 PM- 6:00 PM  Followed with Dr Stover , switching due to insurance change. Doing well, comfortable with pump.  H/o Ulcerative colitis, former smoker.  Here for f/u, doing well.    Current Regimen: mostly in standard pattern. On days when she has wine she switch to pattern A and when on prednisone for UC switch to pattern B.  Insulin pump -   Time Rate (U/hr)   0000 0.425   0400 0.825   11:00 0.825   1500 0.075   1730 0.400         Carbohydrate Ratio -    Time Ratio   0487-0093 14   6934-0881 12   5008-3784 15   5264-8661 13     Sensitivity   58   Active Insulin Time  4 hours   Basal  47 %   Bolus   53 %   Total Carbohydrates/day  158   Total Insulin/day   25   Average Blood Sugar  151   BS Checks 9 times a day   Care Link Username-   Password-         Complications:   Diabetes Complications  Description / Detail    Diabetic Retinopathy  No   CAD / PAD  No   Neuropathy  No   Nephropathy / Microalbuminuria  No   Gastroparesis  No   Hypoglycemia [place]  No     2. Hypertension: Blood Pressure today:   BP Readings from Last 3 Encounters:   05/08/17 110/70   02/07/17 128/60   01/05/17 110/54    Takes medications everyday without forgetting a dose.  Denies feeling lightheaded or dizzy.  Wakes up 0 times a night to urinate.  3.  "Hyperlipidemia:  Denies muscle aches of pains.       PMH/PSH:  Past Medical History:   Diagnosis Date     Basal cell carcinoma      Diabetes mellitus (H)      Myocarditis, unspecified (H) 2011    at dx of type 1 DM, resolved after     Past Surgical History:   Procedure Laterality Date     MOHS MICROGRAPHIC PROCEDURE       Family Hx:  Family History   Problem Relation Age of Onset     Cardiovascular Father      father  of heart failure     Other - See Comments Sister      Tourettes     GASTROINTESTINAL DISEASE Sister      Celiac Sprue     CANCER No family hx of      no skin cancer       Social Hx:  Social History     Social History     Marital status:      Spouse name: N/A     Number of children: N/A     Years of education: N/A     Occupational History     Not on file.     Social History Main Topics     Smoking status: Former Smoker     Smokeless tobacco: Never Used     Alcohol use 0.0 oz/week     0 Standard drinks or equivalent per week      Comment: 5 drinks per week     Drug use: No     Sexual activity: Yes     Partners: Male     Birth control/ protection: Surgical     Other Topics Concern     Not on file     Social History Narrative          MEDICATIONS:  has a current medication list which includes the following prescription(s): nicotine, blood glucose monitoring, lisinopril, minimed reservoir 1.8ml, blood glucose monitoring, insulin aspart, bupropion, fluoxetine, butalbital-acetaminophen-caffeine, nicotine, fluconazole, mesalamine, insulin infusion pump, aspirin, and estradiol.    ROS     ROS: 10 point ROS neg other than the symptoms noted above in the HPI.    Physical Exam   VS: /70 (BP Location: Right arm, Patient Position: Chair, Cuff Size: Adult Regular)  Ht 1.651 m (5' 5\")  Wt 54.9 kg (121 lb)  BMI 20.14 kg/m2  GENERAL: AXOX3, NAD, well dressed, answering questions appropriately, appears stated age.  HEENT: No exopthalmous, no proptosis, EOMI, no lig lag, no retraction  CV: " RRR  LUNGS: CTAB, no wheezes, rales, or ronchi  MSK: grossly intact  SKIN: no rashes, no lesions    LABS:  A1c:  Lab Results   Component Value Date    A1C 7.1 05/08/2017    A1C 6.9 12/29/2016    A1C 7.5 11/14/2016    A1C 7.6 04/18/2016    A1C 7.1 01/12/2016       BMP:  Diabetic Latest Ref Rng 1/28/2016   Glucose 70 - 99 mg/dL 136 (H)   Potassium 3.4 - 5.3 mmol/L 3.8   Sodium 133 - 144 mmol/L 140   CREATININE 0.52 - 1.04 mg/dL 0.48 (L)     Urine Micro:  Diabetic Latest Ref Rng 1/12/2016   Microalb/creat urine 0 - 25 mg/g Cr 7.30     Diabetic Latest Ref Rng 1/7/2015   Microalb/creat urine 0 - 25 mg/g Cr 7.46       LFTs/Lipids:  Diabetic Latest Ref Rng 1/28/2016   AST (liver test) 0 - 45 U/L 16   ALT (liver test) 0 - 50 U/L 22   Cholesterol <200 mg/dL 175   Triglyceride (fat in blood) <150 mg/dL 46   HDL (>45) - good chol >49 mg/dL 69   LDL (<100) - bad chol <100 mg/dL 97           Blood Glucose Meter reviewed.     All pertinent notes, labs, and images personally reviewed by me.     A/P  Ms.Ann ADAN Gage is a 65 year old here for the evaluation/management of diabetes:    1. DM1 - Under Fair control. No known complications.  A1c 7.1 %. BG imroving. Few low BG in afternoon. Tend to rise after dinner.  Basal < bolus.  Recommend checking blood sugars before meals and 2 hours after meals.  Urine microalbumin: wnl  Labs in 3 months  Discussed hypoglycemia signs and symptoms as well as management in detail.  If Blood glucose are low more often-> 2-3 times/week- give us a call.      Insulin pump - changes today    Time Rate (U/hr)   0000 0.425   0400 0.825   11:00 0.825   1500 0.075-->0.50   1730 0.400-->0.425         Carbohydrate Ratio -    Time Ratio   9451-6846 14   1667-8264 12   6935-1096 16   9983-3877 13       Sensitivity   58      Prevention  Flu Shot-9/15  Pneumovax- 1/15  Opthalmology-Yes, 4/16  Dental-Yes  ASA-Yes  Smoking- No, former smoker    2. Hypertension - Under Good  control. Blood pressure medications include  lisinopril 5 mg taking for renal protection. Continue current medical therapy.    3. Hyperlipidemia - Under Good control. ON  lipitor 40 mg.  -- recheck lipid panel in next visit    4. Other.  Patients with Type 1 DM are at risk for other autoimmune diseases.  She is at increase risk for thyroid disorder by 20% and increase risk for Celiac Sprue by 5-10%. TFT' are normal. Celiac sprue  Testing: negative.    Meds ordered today:   No orders of the defined types were placed in this encounter.      Medications Discontinued During This Encounter   Medication Reason     atorvastatin (LIPITOR) 40 MG tablet Not filled/taken by Patient       Follow-up:  follow up in 3 month(s)    Tania Lang MD  Endocrinology   [facility]  CC: Jenniffer Molina    More than 50% of face to face time spent with Ms. Gage on counseling / coordinating her care.  Total face to face time was greater than 25 minutes.     All questions were answered.  The patient indicates understanding of the above issues and agrees with the plan set forth.

## 2017-06-02 DIAGNOSIS — E10.9 TYPE 1 DIABETES MELLITUS WITHOUT COMPLICATION (H): ICD-10-CM

## 2017-06-05 RX ORDER — LISINOPRIL 5 MG/1
TABLET ORAL
Qty: 45 TABLET | Refills: 0 | OUTPATIENT
Start: 2017-06-05

## 2017-06-05 NOTE — TELEPHONE ENCOUNTER
This is a different pharmacy requesting.   Called patient to clarify if she wants this sent to a local   Pharmacy now. She called the wrong pharmacy, she will  Call the mail order pharmacy and request this from them.   Veronika Nuñez RN  Triage Nurse

## 2017-06-05 NOTE — TELEPHONE ENCOUNTER
DUPLICATE.    lisinopril (PRINIVIL/ZESTRIL) 5 MG tablet WAS FILLED ON 2/27/2017, QTY 45 WITH 3 REFILLS.

## 2017-06-13 ENCOUNTER — OFFICE VISIT (OUTPATIENT)
Dept: DERMATOLOGY | Facility: CLINIC | Age: 67
End: 2017-06-13
Payer: COMMERCIAL

## 2017-06-13 VITALS — SYSTOLIC BLOOD PRESSURE: 108 MMHG | DIASTOLIC BLOOD PRESSURE: 62 MMHG | OXYGEN SATURATION: 96 % | HEART RATE: 71 BPM

## 2017-06-13 DIAGNOSIS — L30.9 DERMATITIS: Primary | ICD-10-CM

## 2017-06-13 DIAGNOSIS — L81.4 SOLAR LENTIGINOSIS: ICD-10-CM

## 2017-06-13 DIAGNOSIS — L82.1 SEBORRHEIC KERATOSIS: ICD-10-CM

## 2017-06-13 DIAGNOSIS — Z12.83 SKIN CANCER SCREENING: ICD-10-CM

## 2017-06-13 PROCEDURE — 99203 OFFICE O/P NEW LOW 30 MIN: CPT | Performed by: DERMATOLOGY

## 2017-06-13 RX ORDER — TRIAMCINOLONE ACETONIDE 1 MG/G
OINTMENT TOPICAL
Qty: 80 G | Refills: 1 | Status: SHIPPED | OUTPATIENT
Start: 2017-06-13

## 2017-06-13 NOTE — NURSING NOTE
"Chief Complaint   Patient presents with     Consult     rash on hands/fingers, comes and goes. Dark spot on chest that wants checked out       Initial /62  Pulse 71  SpO2 96% Estimated body mass index is 20.14 kg/(m^2) as calculated from the following:    Height as of 5/8/17: 5' 5\" (165.1 cm).    Weight as of 5/8/17: 121 lb (54.9 kg).  Medication Reconciliation: complete  Lili High CMA  "

## 2017-06-13 NOTE — LETTER
6/13/2017      RE: Patricia Gage  [address] [facility] [address]       Dermatology Clinic Note    Dermatology Problem List:  1. Seborrheic keratoses  2. Solar lentigines  3. History of Basal cell carcinoma on the R lower cheek, removed at [facility]  4. Contact dermatitis of the hands      CC: Patient presents with:  Consult: rash on hands/fingers, comes and goes. Dark spot on chest that wants checked out      HPI: Patricia Gage is a 66 year old female presenting for initial evaluation of skin spots and hand rash. She is seen at the request of Dr. Molina. Patient has history of basal cell carcinoma on the R cheek about 5 years ago.  Since that time she has had no new lesions.  She notes a new brown spot on the R upper chest that is growing. She also notes recurrent rash on the hands, especially under her ring. When she moves the ring to a different finger the same itching rash occurs. Has tried using otc meds without benefit. Washes hands with Softsoap.       Patient Active Problem List   Diagnosis     Left sided ulcerative (chronic) colitis (H)     CARDIOVASCULAR SCREENING; LDL GOAL LESS THAN 100     Advanced directives, counseling/discussion     Anxiety     Migraine     Menopause     Long term current use of insulin (H)     Tobacco abuse     Hx of basal cell carcinoma     Inflamed seborrheic keratosis     Seborrheic keratosis     Solar lentiginosis     Skin cancer screening     Cherry angioma     Type 1 diabetes mellitus without complication (H)       No Known Allergies      Current Outpatient Prescriptions   Medication     triamcinolone (KENALOG) 0.1 % ointment     nicotine (NICODERM CQ) 14 MG/24HR 24 hr patch     blood glucose monitoring (NO BRAND SPECIFIED) test strip     lisinopril (PRINIVIL/ZESTRIL) 5 MG tablet     Insulin Infusion Pump Supplies (MINIMED RESERVOIR 1.8ML) MISC     blood glucose monitoring (NO BRAND SPECIFIED) test strip     insulin aspart (NOVOLOG VIAL) 100 UNITS/ML  injection     buPROPion (WELLBUTRIN SR) 150 MG 12 hr tablet     FLUoxetine (PROZAC) 20 MG capsule     butalbital-acetaminophen-caffeine (FIORICET/ESGIC) -40 MG per tablet     nicotine (NICODERM CQ) 7 MG/24HR patch 2h hr     estradiol (ESTRACE VAGINAL) 0.1 MG/GM vaginal cream     fluconazole (DIFLUCAN) 150 MG tablet     mesalamine (ASACOL HD) 800 MG EC tablet     Insulin Infusion Pump (PARADIGM REVEL INSULIN PUMP) LINH     aspirin 81 MG EC tablet     No current facility-administered medications for this visit.        Family History   Problem Relation Age of Onset     Cardiovascular Father      father  of heart failure     Other - See Comments Sister      Lila     GASTROINTESTINAL DISEASE Sister      Celiac Sprue     CANCER No family hx of      no skin cancer       Social History     Social History     Marital status:      Spouse name: N/A     Number of children: N/A     Years of education: N/A     Occupational History     Not on file.     Social History Main Topics     Smoking status: Former Smoker     ROS: Feeling well without other skin concerns.     EXAM:  /62  Pulse 71  SpO2 96%  GEN: Alert, no distress  HEENT: Conjunctiva clear.   PULM: Breathing comfortably on RA  CV: Extrem warm and well perfused  ABD: No distension  SKIN: Exam of the face, neck, chest, abdomen, back, arms, legs, hands, feet, buttocks. Normal except as follows:  --Scattered 2-4 mm raised papules on the arms, legs, chest- tan and brown, waxy  --R upper chest with 1 cm brown and tan plaque, waxy  --Scar on the R lower cheek/chin without nodularity  --Scaling patch on the L mid back  --L 4th finger with erythema and scale in web spaces      Assessment and Plan:    1. Dermatitis of hands: Allergic contact vs more likely irritant dermatitis. Advised transition to a gentle cleanser and use of triamcinolone ointment BID as needed to the affected area.   - triamcinolone (KENALOG) 0.1 % ointment; To hand rash twice daily  as needed.  Dispense: 80 g; Refill: 1    2. Seborrheic keratoses: Benign hyperkeratotic papules and plaques. No treatment advised unless irritation occurs.     3. History of Basal cell carcinoma: No evidence of recurrence. Requesting records.     4. Solar Lentigines: From past history of sun exposure. Sun protection advise.     RTC in 1 year, sooner if needed.       Thank you for involving me in this patient's care.     Emiliana Doshi MD  Dermatology Staff    CC: No referring provider defined for this encounter.    Jenniffer Molina MD

## 2017-06-13 NOTE — MR AVS SNAPSHOT
After Visit Summary   6/13/2017    Patricia Gage    MRN: [id number]           Patient Information     Date Of Birth          1950        Visit Information        Provider Department      6/13/2017 3:45 PM Emiliana Doshi MD [facility]        Today's Diagnoses     Dermatitis    -  1    Seborrheic keratosis        Solar lentiginosis        Skin cancer screening           Follow-ups after your visit        Your next 10 appointments already scheduled     Aug 08, 2017  4:00 PM CDT   Return Visit with Tania Lang MD   [facility] ([facility])    [address] [place] [address]  [address]  [facility] [address] [phone]              Who to contact     If you have questions or need follow up information about today's clinic visit or your schedule please contact [facility] directly at [phone].  Normal or non-critical lab and imaging results will be communicated to you by MyChart, letter or phone within 4 business days after the clinic has received the results. If you do not hear from us within 7 days, please contact the clinic through [employer] or phone. If you have a critical or abnormal lab result, we will notify you by phone as soon as possible.  Submit refill requests through [employer] or call your pharmacy and they will forward the refill request to us. Please allow 3 business days for your refill to be completed.          Additional Information About Your Visit        MyChart Information     [employer] gives you secure access to your electronic health record. If you see a primary care provider, you can also send messages to your care team and make appointments. If you have questions, please call your primary care clinic.  If you do not have a primary care provider, please call [phone] and they will assist you.        Care EveryWhere ID     This is your Care EveryWhere ID. This could be used by other organizations to access your  [facility] medical records  [id number]        Your Vitals Were     Pulse Pulse Oximetry                71 96%           Blood Pressure from Last 3 Encounters:   06/13/17 108/62   05/08/17 110/70   02/07/17 128/60    Weight from Last 3 Encounters:   05/08/17 121 lb (54.9 kg)   02/07/17 136 lb (61.7 kg)   01/05/17 122 lb 2 oz (55.4 kg)              Today, you had the following     No orders found for display         Today's Medication Changes          These changes are accurate as of: 6/13/17 11:59 PM.  If you have any questions, ask your nurse or doctor.               Start taking these medicines.        Dose/Directions    triamcinolone 0.1 % ointment   Commonly known as:  KENALOG   Used for:  Dermatitis   Started by:  Emiliana Doshi MD        To hand rash twice daily as needed.   Quantity:  80 g   Refills:  1            Where to get your medicines      These medications were sent to [id number]  [facility] - [address] AT [facility] [address]  [address], [place] [address]     Phone:  [phone]     triamcinolone 0.1 % ointment                Primary Care Provider Office Phone # Fax #    Jenniffer Molina [phone]       [address] [place] [address]        Thank you!     Thank you for choosing [facility]  for your care. Our goal is always to provide you with excellent care. Hearing back from our patients is one way we can continue to improve our services. Please take a few minutes to complete the written survey that you may receive in the mail after your visit with us. Thank you!             Your Updated Medication List - Protect others around you: Learn how to safely use, store and throw away your medicines at www.disposemymeds.org.          This list is accurate as of: 6/13/17 11:59 PM.  Always use your most recent med list.                   Brand Name Dispense Instructions for use     ASACOL  MG EC tablet   Generic drug:  mesalamine     180 tablet    3 tablets twice daily       aspirin 81 MG EC tablet     90 tablet    Take 1 tablet by mouth daily.       * blood glucose monitoring test strip    no brand specified    300 each    Please dispense glucometer compatible strips. Check 8 times a day       * blood glucose monitoring test strip    no brand specified    300 strip    Use to test blood sugars 7 times daily or as directed       buPROPion 150 MG 12 hr tablet    WELLBUTRIN SR    180 tablet    Take 1 tablet (150 mg) by mouth 2 times daily       butalbital-acetaminophen-caffeine -40 MG per tablet    FIORICET/ESGIC    30 tablet    Take 1 tablet by mouth every 4 hours as needed       estradiol 0.1 MG/GM cream    ESTRACE VAGINAL    30 g    Place 2 g vaginally twice a week       fluconazole 150 MG tablet    DIFLUCAN    12 tablet    Take 1 tablet (150 mg) by mouth every 7 days       FLUoxetine 20 MG capsule    PROzac    90 capsule    Take 1 capsule (20 mg) by mouth daily       insulin aspart 100 UNITS/ML injection    NovoLOG VIAL    30 mL    About 35 units/day in insulin pump       insulin infusion pump device      100 units/carb unit. 50 units SQ continuous       lisinopril 5 MG tablet    PRINIVIL/ZESTRIL    45 tablet    Take 0.5 tablets (2.5 mg) by mouth daily       MINIMED RESERVOIR 1.8ML Misc     30 each    1 each daily as needed Change insulin reservoir, set and site every 2-3 days       * nicotine 7 MG/24HR 24 hr patch    NICODERM CQ    30 patch    Place 1 patch onto the skin every 24 hours       * nicotine 14 MG/24HR 24 hr patch    NICODERM CQ    30 patch    Place 1 patch onto the skin every 24 hours       triamcinolone 0.1 % ointment    KENALOG    80 g    To hand rash twice daily as needed.       * Notice:  This list has 4 medication(s) that are the same as other medications prescribed for you. Read the directions carefully, and ask your doctor or other care provider to review them  with you.

## 2017-06-14 NOTE — PROGRESS NOTES
Dermatology Clinic Note    Dermatology Problem List:  1. Seborrheic keratoses  2. Solar lentigines  3. History of Basal cell carcinoma on the R lower cheek, removed at [facility]  4. Contact dermatitis of the hands      CC: Patient presents with:  Consult: rash on hands/fingers, comes and goes. Dark spot on chest that wants checked out      HPI: Patricia Gage is a 66 year old female presenting for initial evaluation of skin spots and hand rash. She is seen at the request of Dr. Molina. Patient has history of basal cell carcinoma on the R cheek about 5 years ago.  Since that time she has had no new lesions.  She notes a new brown spot on the R upper chest that is growing. She also notes recurrent rash on the hands, especially under her ring. When she moves the ring to a different finger the same itching rash occurs. Has tried using otc meds without benefit. Washes hands with Softsoap.       Patient Active Problem List   Diagnosis     Left sided ulcerative (chronic) colitis (H)     CARDIOVASCULAR SCREENING; LDL GOAL LESS THAN 100     Advanced directives, counseling/discussion     Anxiety     Migraine     Menopause     Long term current use of insulin (H)     Tobacco abuse     Hx of basal cell carcinoma     Inflamed seborrheic keratosis     Seborrheic keratosis     Solar lentiginosis     Skin cancer screening     Cherry angioma     Type 1 diabetes mellitus without complication (H)       No Known Allergies      Current Outpatient Prescriptions   Medication     triamcinolone (KENALOG) 0.1 % ointment     nicotine (NICODERM CQ) 14 MG/24HR 24 hr patch     blood glucose monitoring (NO BRAND SPECIFIED) test strip     lisinopril (PRINIVIL/ZESTRIL) 5 MG tablet     Insulin Infusion Pump Supplies (MINIMED RESERVOIR 1.8ML) MISC     blood glucose monitoring (NO BRAND SPECIFIED) test strip     insulin aspart (NOVOLOG VIAL) 100 UNITS/ML injection     buPROPion (WELLBUTRIN SR) 150 MG 12 hr tablet     FLUoxetine (PROZAC) 20 MG  capsule     butalbital-acetaminophen-caffeine (FIORICET/ESGIC) -40 MG per tablet     nicotine (NICODERM CQ) 7 MG/24HR patch 2h hr     estradiol (ESTRACE VAGINAL) 0.1 MG/GM vaginal cream     fluconazole (DIFLUCAN) 150 MG tablet     mesalamine (ASACOL HD) 800 MG EC tablet     Insulin Infusion Pump (PARADIGM REVEL INSULIN PUMP) LINH     aspirin 81 MG EC tablet     No current facility-administered medications for this visit.        Family History   Problem Relation Age of Onset     Cardiovascular Father      father  of heart failure     Other - See Comments Sister      Tourettes     GASTROINTESTINAL DISEASE Sister      Celiac Sprue     CANCER No family hx of      no skin cancer       Social History     Social History     Marital status:      Spouse name: N/A     Number of children: N/A     Years of education: N/A     Occupational History     Not on file.     Social History Main Topics     Smoking status: Former Smoker     ROS: Feeling well without other skin concerns.     EXAM:  /62  Pulse 71  SpO2 96%  GEN: Alert, no distress  HEENT: Conjunctiva clear.   PULM: Breathing comfortably on RA  CV: Extrem warm and well perfused  ABD: No distension  SKIN: Exam of the face, neck, chest, abdomen, back, arms, legs, hands, feet, buttocks. Normal except as follows:  --Scattered 2-4 mm raised papules on the arms, legs, chest- tan and brown, waxy  --R upper chest with 1 cm brown and tan plaque, waxy  --Scar on the R lower cheek/chin without nodularity  --Scaling patch on the L mid back  --L 4th finger with erythema and scale in web spaces      Assessment and Plan:    1. Dermatitis of hands: Allergic contact vs more likely irritant dermatitis. Advised transition to a gentle cleanser and use of triamcinolone ointment BID as needed to the affected area.   - triamcinolone (KENALOG) 0.1 % ointment; To hand rash twice daily as needed.  Dispense: 80 g; Refill: 1    2. Seborrheic keratoses: Benign hyperkeratotic  papules and plaques. No treatment advised unless irritation occurs.     3. History of Basal cell carcinoma: No evidence of recurrence. Requesting records.     4. Solar Lentigines: From past history of sun exposure. Sun protection advise.     RTC in 1 year, sooner if needed.       Thank you for involving me in this patient's care.     Emiliana Doshi MD  Dermatology Staff    CC: No referring provider defined for this encounter.    Jenniffer Molina MD

## 2017-06-29 ENCOUNTER — MYC MEDICAL ADVICE (OUTPATIENT)
Dept: PEDIATRICS | Facility: CLINIC | Age: 67
End: 2017-06-29

## 2017-07-03 ENCOUNTER — MYC MEDICAL ADVICE (OUTPATIENT)
Dept: PEDIATRICS | Facility: CLINIC | Age: 67
End: 2017-07-03

## 2017-07-03 DIAGNOSIS — Z12.31 ENCOUNTER FOR SCREENING MAMMOGRAM FOR BREAST CANCER: Primary | ICD-10-CM

## 2017-07-13 ENCOUNTER — TRANSFERRED RECORDS (OUTPATIENT)
Dept: HEALTH INFORMATION MANAGEMENT | Facility: CLINIC | Age: 67
End: 2017-07-13

## 2017-07-18 ENCOUNTER — RADIANT APPOINTMENT (OUTPATIENT)
Dept: MAMMOGRAPHY | Facility: CLINIC | Age: 67
End: 2017-07-18
Attending: INTERNAL MEDICINE
Payer: COMMERCIAL

## 2017-07-18 DIAGNOSIS — Z12.31 ENCOUNTER FOR SCREENING MAMMOGRAM FOR BREAST CANCER: ICD-10-CM

## 2017-07-18 PROCEDURE — G0202 SCR MAMMO BI INCL CAD: HCPCS | Mod: TC

## 2017-07-18 PROCEDURE — 77063 BREAST TOMOSYNTHESIS BI: CPT | Mod: TC

## 2017-08-09 DIAGNOSIS — E10.9 TYPE 1 DIABETES MELLITUS WITHOUT COMPLICATION (H): Primary | ICD-10-CM

## 2017-08-09 NOTE — TELEPHONE ENCOUNTER
Called pt, states is using [facility] mail order pharmacy, costs less and needs to be a 3 month Rx. Sent Rx to [facility].     Test strips         Last Written Prescription Date: 3/16/17  Last Fill Quantity: 300, # refills: 11  Last Office Visit with FMLINDSAY, JUVENCIOP or [facility] prescribing provider:  5/8/17   Next 5 appointments (look out 90 days)     Sep 05, 2017  4:00 PM CDT   Return Visit with Tania Lang MD   [facility] ([facility] ([facility]3])    [address] [place] [address]  [address]  [facility] [address] [phone]                   BP Readings from Last 3 Encounters:   06/13/17 108/62   05/08/17 110/70   02/07/17 128/60     Lab Results   Component Value Date    MICROL 8 12/29/2016     Lab Results   Component Value Date    UMALCR 14.73 12/29/2016     Creatinine   Date Value Ref Range Status   12/29/2016 0.55 0.52 - 1.04 mg/dL Final   ]  GFR Estimate   Date Value Ref Range Status   12/29/2016 >90  Non  GFR Calc   >60 mL/min/1.7m2 Final   04/19/2016 >60 ml/min/1.73m2 Final   01/28/2016 >90  Non  GFR Calc   >60 mL/min/1.7m2 Final     GFR Estimate If Black   Date Value Ref Range Status   12/29/2016 >90   GFR Calc   >60 mL/min/1.7m2 Final   04/19/2016 >60 ml/min/1.73m2 Final   01/28/2016 >90   GFR Calc   >60 mL/min/1.7m2 Final     Lab Results   Component Value Date    CHOL 170 12/29/2016     Lab Results   Component Value Date    HDL 75 12/29/2016     Lab Results   Component Value Date    LDL 85 12/29/2016     Lab Results   Component Value Date    TRIG 51 12/29/2016     Lab Results   Component Value Date    CHOLHDLRATIO 2.4 01/07/2015     Lab Results   Component Value Date    AST 14 12/29/2016     Lab Results   Component Value Date    ALT 19 12/29/2016     Lab Results   Component Value Date    A1C 7.1 05/08/2017    A1C 6.9 12/29/2016    A1C 7.5 11/14/2016    A1C 7.6 04/18/2016    A1C 7.1 01/12/2016     Potassium   Date Value Ref Range  Status   12/29/2016 4.0 3.4 - 5.3 mmol/L Final       Labs showing if normal/abnormal  Lab Results   Component Value Date    UCRR 55 12/29/2016    MICROL 8 12/29/2016     Lab Results   Component Value Date    CHOL 170 12/29/2016    TRIG 51 12/29/2016    HDL 75 12/29/2016    LDL 85 12/29/2016    VLDL 9 01/07/2015    CHOLHDLRATIO 2.4 01/07/2015     Lab Results   Component Value Date    A1C 7.1 (H) 05/08/2017    A1C 6.9 (H) 12/29/2016    A1C 7.5 (H) 11/14/2016

## 2017-09-05 ENCOUNTER — OFFICE VISIT (OUTPATIENT)
Dept: ENDOCRINOLOGY | Facility: CLINIC | Age: 67
End: 2017-09-05
Payer: COMMERCIAL

## 2017-09-05 VITALS
SYSTOLIC BLOOD PRESSURE: 112 MMHG | OXYGEN SATURATION: 97 % | WEIGHT: 121.5 LBS | DIASTOLIC BLOOD PRESSURE: 64 MMHG | HEART RATE: 82 BPM | BODY MASS INDEX: 20.22 KG/M2

## 2017-09-05 DIAGNOSIS — Z79.4 LONG TERM CURRENT USE OF INSULIN (H): ICD-10-CM

## 2017-09-05 DIAGNOSIS — E10.9 TYPE 1 DIABETES MELLITUS WITHOUT COMPLICATION (H): Primary | ICD-10-CM

## 2017-09-05 PROBLEM — Z96.41 INSULIN PUMP IN PLACE: Status: ACTIVE | Noted: 2017-09-05

## 2017-09-05 PROCEDURE — 99214 OFFICE O/P EST MOD 30 MIN: CPT | Performed by: INTERNAL MEDICINE

## 2017-09-05 NOTE — PATIENT INSTRUCTIONS
[facility]   Dr Lang, Endocrinology Department      [facility]   [address] [address]  Appointment [phone]  Fax: [phone]   Monday and Tuesday         [facility]   [address] [place] [address]  Appointment [phone]  Fax: [phone]  Wednesday and Thursday           Labs in 3 months and follow up after that.    Switch to sick day    Switch back to basal 1 when done with prednisone    Send me blood glucose records in 2 weeks after you are done with prednisone    Recommend checking blood sugars before meals and at bedtime.    If Blood glucose are low more often-> 2-3 times/week- give us a call.  The patient is advised to Make better food choices: reduce carbs, Reduce portion size, weight loss and exercise 3-4 times a week.  Discussed hypoglycemia signs and symptoms as well as management in detail.

## 2017-09-05 NOTE — PROGRESS NOTES
Name: Patricia Gage  Seen for Diabetes.  BERTIN .  Last visit with her 4/2016  HPI:  Patricia Gage is a 65 year old female who presents for the evaluation/management of type 1 diabetes.  A1C      6.9   12/29/2016  A1C      7.5   11/14/2016  On 630 G- though not using sensors ( does not like adhesive)    Recently had a flare of ulcerative colitis and was started on prednisone about four weeks back.  Currently on prednisone taper.  She will be done with prednisone taper the next 8-10 days.  Blood sugars are running high in 200-300s, occasionally 400 while on prednisone.  Initially after starting prednisone she switched to sick day pattern on insulin pump.  About one week back she switched back to standard pattern.  He reports that blood sugars were a slightly better controlled while on sick day pattern.  On standard pattern blood sugars are mostly running high though there is some variation.    Has sensors at home but not using it.    1. Type 1 DM:  Orginally diagnosed at the age of 60 ( 10/2011).  DKA at diagnosis, sick at the time with vomiting and disorientation,, glucose of 800.Started on lantus and novolog, switched to insulin pump therapy in 2/12  Current Regimen:Humalog via 630 G insulin pump  BS checks: 8-10 per day  Average Meter Download: 229. Variable BG in AM. During day mostly high.    Followed with Dr Stover , switching due to insurance change. Doing well, comfortable with pump.  H/o Ulcerative colitis, former smoker.  Here for f/u, doing well.    Current Regimen: mostly in standard pattern. On days when she has wine she switch to pattern A and when on prednisone for UC switch to pattern B.  Insulin pump -   Time Rate (U/hr)   0000 0.425   0400 0.725   11:00 0.825   1500 0.050   1730 0.425         Carbohydrate Ratio -    Time Ratio   6528-4581 14   4148-6304 13   0964-4241 17   8304-4264 13     Sensitivity   58   Active Insulin Time  4 hours   Basal  41 %   Bolus   59 %   Total Carbohydrates/day  167    Total Insulin/day   31   Average Blood Sugar  151   BS Checks 9 times a day   Care Link Username-   Password-         Complications:   Diabetes Complications  Description / Detail    Diabetic Retinopathy  No   CAD / PAD  No   Neuropathy  No   Nephropathy / Microalbuminuria  No   Gastroparesis  No   Hypoglycemia [place]  No     2. Hypertension: Blood Pressure today:   BP Readings from Last 3 Encounters:   17 112/64   17 108/62   17 110/70    Takes medications everyday without forgetting a dose.  Denies feeling lightheaded or dizzy.  Wakes up 0 times a night to urinate.  3. Hyperlipidemia:  Denies muscle aches of pains.       PMH/PSH:  Past Medical History:   Diagnosis Date     Basal cell carcinoma      Diabetes mellitus (H)      Myocarditis, unspecified (H) 2011    at dx of type 1 DM, resolved after     Past Surgical History:   Procedure Laterality Date     MOHS MICROGRAPHIC PROCEDURE       Family Hx:  Family History   Problem Relation Age of Onset     Cardiovascular Father      father  of heart failure     Other - See Comments Sister      Tourettes     GASTROINTESTINAL DISEASE Sister      Celiac Sprue     CANCER No family hx of      no skin cancer       Social Hx:  Social History     Social History     Marital status:      Spouse name: N/A     Number of children: N/A     Years of education: N/A     Occupational History     Not on file.     Social History Main Topics     Smoking status: Former Smoker     Smokeless tobacco: Never Used     Alcohol use 0.0 oz/week     0 Standard drinks or equivalent per week      Comment: 5 drinks per week     Drug use: No     Sexual activity: Yes     Partners: Male     Birth control/ protection: Surgical     Other Topics Concern     Not on file     Social History Narrative          MEDICATIONS:  has a current medication list which includes the following prescription(s): shea contour next, triamcinolone, nicotine, blood glucose monitoring,  lisinopril, minimed reservoir 1.8ml, blood glucose monitoring, insulin aspart, bupropion, fluoxetine, butalbital-acetaminophen-caffeine, nicotine, fluconazole, mesalamine, insulin infusion pump, aspirin, and estradiol.    ROS     ROS: 10 point ROS neg other than the symptoms noted above in the HPI.    Physical Exam   VS: /64 (BP Location: Right arm, Patient Position: Chair, Cuff Size: Adult Regular)  Pulse 82  Wt 55.1 kg (121 lb 8 oz)  SpO2 97%  BMI 20.22 kg/m2  GENERAL: AXOX3, NAD, well dressed, answering questions appropriately, appears stated age.  HEENT: No exopthalmous, no proptosis, EOMI, no lig lag, no retraction  CV: RRR  LUNGS: CTAB, no wheezes, rales, or ronchi  MSK: grossly intact  SKIN: no rashes, no lesions  PSYCH: normal affect and mood    LABS:  A1c:  Lab Results   Component Value Date    A1C 7.1 05/08/2017    A1C 6.9 12/29/2016    A1C 7.5 11/14/2016    A1C 7.6 04/18/2016    A1C 7.1 01/12/2016       BMP:  Creatinine   Date Value Ref Range Status   12/29/2016 0.55 0.52 - 1.04 mg/dL Final     Urine Micro:  Lab Results   Component Value Date    UMALCR 14.73 12/29/2016      Diabetic Latest Ref Rng 1/12/2016   Microalb/creat urine 0 - 25 mg/g Cr 7.30     Diabetic Latest Ref Rng 1/7/2015   Microalb/creat urine 0 - 25 mg/g Cr 7.46       LFTs/Lipids:  Recent Labs   Lab Test  12/29/16   0726  01/28/16   0716  01/07/15   0705   01/22/14   0701   CHOL  170  175  167   --   153   HDL  75  69  71   --   60   LDL  85  97  87   < >  82   TRIG  51  46  45   --   53   CHOLHDLRATIO   --    --   2.4   --   2.5    < > = values in this interval not displayed.         Blood Glucose Meter reviewed.     All pertinent notes, labs, and images personally reviewed by me.     A/P  Ms.Ann ADAN Gage is a 65 year old here for the evaluation/management of diabetes:    1. DM1 - Under Fair control. No known complications.  Blood sugars are high in the setting of prednisone use for a flare of ulcerative colitis.  She is using  standard basal for last one week and before that she was using sick day basal and blood sugars were under better control at that time.  She is still 8-10 days off prednisone remaining.    Plan:  Switch to sick day basal and decrease basal rate slightly at midnight and early morning.  A1c and labs in three months  I asked her to give us a call once she is off prednisone to see how numbers are  One she is off prednisone she will switch to standard basal 1 pattern      Insulin pump - changes today in sick day only. No changes in basal 1.    Time Rate (U/hr)   0000 0.650-->0.600   0400 0.800-->0.750   0530 0.900   1100 0.875   0300 PM 0.4   0600 PM     0.550     Carbohydrate Ratio -    Time Ratio   0329-9030 14   6390-4161 13   3385-2117 17   7409-2455 13       Sensitivity   58      Prevention  Flu Shot-9/15  Pneumovax- 1/15  Opthalmology-Yes, 4/16  Dental-Yes  ASA-Yes  Smoking- No, former smoker    2. Hypertension - Under Good  control. Blood pressure medications include lisinopril 5 mg taking for renal protection. Continue current medical therapy.    3. Hyperlipidemia - Under Good control. ON  lipitor 40 mg.  -- recheck lipid panel in next visit    4. Other.  Patients with Type 1 DM are at risk for other autoimmune diseases.  She is at increase risk for thyroid disorder by 20% and increase risk for Celiac Sprue by 5-10%. TFT' are normal. Celiac sprue  Testing: negative.      Follow-up:  follow up in 3 month(s)    Tania Lang MD  Endocrinology   [facility]  CC: Jenniffer Molina    More than 50% of face to face time spent with Ms. Gage on counseling / coordinating her care.  Total face to face time was greater than 25 minutes.     All questions were answered.  The patient indicates understanding of the above issues and agrees with the plan set forth.

## 2017-09-05 NOTE — NURSING NOTE
"Chief Complaint   Patient presents with     RECHECK     was recently on prednisone for ulcerative colitis       Initial /64 (BP Location: Right arm, Patient Position: Chair, Cuff Size: Adult Regular)  Pulse 82  Wt 55.1 kg (121 lb 8 oz)  SpO2 97%  BMI 20.22 kg/m2 Estimated body mass index is 20.22 kg/(m^2) as calculated from the following:    Height as of 5/8/17: 1.651 m (5' 5\").    Weight as of this encounter: 55.1 kg (121 lb 8 oz).  Medication Reconciliation: complete  Lili High, ANIKET  "

## 2017-09-05 NOTE — MR AVS SNAPSHOT
After Visit Summary   2017    Patricia Gage    MRN: [id number]           Patient Information     Date Of Birth          1950        Visit Information        Provider Department      2017 4:00 PM Tania Lang MD [facility]        Today's Diagnoses     Type 1 diabetes mellitus without complication (H)    -  1    Long term current use of insulin (H)          [facility] Inst[facility]   Dr Lang, Endocrinology Department      [facility]   [address] [facility] [address]  Appointment [phone]  Fax: [phone]   Monday and Tuesday         [place] [address] [place] [address]  Appointment [phone]  Fax: [phone]  Wednesday and Thursday           Labs in 3 months and follow up after that.    Switch to sick day    Switch back to basal 1 when done with prednisone    Send me blood glucose records in 2 weeks after you are done with prednisone    Recommend checking blood sugars before meals and at bedtime.    If Blood glucose are low more often-> 2-3 times/week- give us a call.  The patient is advised to Make better food choices: reduce carbs, Reduce portion size, weight loss and exercise 3-4 times a week.  Discussed hypoglycemia signs and symptoms as well as management in detail.                Follow-ups after your visit        Future tests that were ordered for you today     Open Future Orders        Priority Expected Expires Ordered    Hemoglobin A1c ASAP 2017            Who to contact     If you have questions or need follow up information about today's clinic visit or your schedule please contact [facility] directly at [phone].  Normal or non-critical lab and imaging results will be communicated to you by MyChart, letter or phone within 4 business days after the clinic has  received the results. If you do not hear from us within 7 days, please contact the clinic through [facility] or phone. If you have a critical or abnormal lab result, we will notify you by phone as soon as possible.  Submit refill requests through [employer] or call your pharmacy and they will forward the refill request to us. Please allow 3 business days for your refill to be completed.          Additional Information About Your Visit        [employer] Information     [employer] gives you secure access to your electronic health record. If you see a primary care provider, you can also send messages to your care team and make appointments. If you have questions, please call your primary care clinic.  If you do not have a primary care provider, please call [phone] and they will assist you.        Care EveryWhere ID     This is your Care EveryWhere ID. This could be used by other organizations to access your [place] medical records  [id number]        Your Vitals Were     Pulse Pulse Oximetry BMI (Body Mass Index)             82 97% 20.22 kg/m2          Blood Pressure from Last 3 Encounters:   09/05/17 112/64   06/13/17 108/62   05/08/17 110/70    Weight from Last 3 Encounters:   09/05/17 55.1 kg (121 lb 8 oz)   05/08/17 54.9 kg (121 lb)   02/07/17 61.7 kg (136 lb)               Primary Care Provider Office Phone # Fax #    Jenniffer Molina [phone] [address] [place] [address]        Equal Access to Services     MIGUEL ÁNGEL HENRY AH: Hadii kt delgadoo Sosergio, waaxda luqadaha, qaybta kaalmada capri, nkechi casey. So [facility] [phone].    ATENCIÓN: Si habla español, tiene a bazzi disposición servicios gratuitos de asistencia lingüística. Llame al [phone].    We comply with applicable federal civil rights laws and [place] laws. We do not discriminate on the basis of race, color, national origin, age, disability sex, sexual orientation or  gender identity.            Thank you!     Thank you for choosing [facility]  for your care. Our goal is always to provide you with excellent care. Hearing back from our patients is one way we can continue to improve our services. Please take a few minutes to complete the written survey that you may receive in the mail after your visit with us. Thank you!             Your Updated Medication List - Protect others around you: Learn how to safely use, store and throw away your medicines at www.disposemymeds.org.          This list is accurate as of: 9/5/17  4:34 PM.  Always use your most recent med list.                   Brand Name Dispense Instructions for use Diagnosis    ASACOL  MG EC tablet   Generic drug:  mesalamine     180 tablet    3 tablets twice daily        aspirin 81 MG EC tablet     90 tablet    Take 1 tablet by mouth daily.    DM (diabetes mellitus), type 1 (H)       * blood glucose monitoring test strip    no brand specified    300 each    Please dispense glucometer compatible strips. Check 8 times a day    Type 1 diabetes mellitus without complication (H)       * blood glucose monitoring test strip    no brand specified    300 strip    Use to test blood sugars 7 times daily or as directed    Type 1 diabetes mellitus without complication (H)       * SUNIL CONTOUR NEXT test strip   Generic drug:  blood glucose monitoring     900 strip    CHECK  8  TIMES DAILY    Type 1 diabetes mellitus without complication (H)       buPROPion 150 MG 12 hr tablet    WELLBUTRIN SR    180 tablet    Take 1 tablet (150 mg) by mouth 2 times daily    Anxiety       butalbital-acetaminophen-caffeine -40 MG per tablet    FIORICET/ESGIC    30 tablet    Take 1 tablet by mouth every 4 hours as needed    Migraine without aura and without status migrainosus, not intractable       estradiol 0.1 MG/GM cream    ESTRACE VAGINAL    30 g    Place 2 g vaginally twice a week    Atrophic vaginitis       fluconazole 150  MG tablet    DIFLUCAN    12 tablet    Take 1 tablet (150 mg) by mouth every 7 days    Vaginal yeast infection       FLUoxetine 20 MG capsule    PROzac    90 capsule    Take 1 capsule (20 mg) by mouth daily    Anxiety       insulin aspart 100 UNITS/ML injection    NovoLOG VIAL    30 mL    About 35 units/day in insulin pump    Type 1 diabetes mellitus without complication (H)       insulin infusion pump device      100 units/carb unit. 50 units SQ continuous        lisinopril 5 MG tablet    PRINIVIL/ZESTRIL    45 tablet    Take 0.5 tablets (2.5 mg) by mouth daily    Type 1 diabetes mellitus without complication (H)       MINIMED RESERVOIR 1.8ML Misc     30 each    1 each daily as needed Change insulin reservoir, set and site every 2-3 days    Type 1 diabetes mellitus without complication (H)       * nicotine 7 MG/24HR 24 hr patch    NICODERM CQ    30 patch    Place 1 patch onto the skin every 24 hours    Tobacco abuse       * nicotine 14 MG/24HR 24 hr patch    NICODERM CQ    30 patch    Place 1 patch onto the skin every 24 hours    Tobacco abuse       triamcinolone 0.1 % ointment    KENALOG    80 g    To hand rash twice daily as needed.    Dermatitis       * Notice:  This list has 5 medication(s) that are the same as other medications prescribed for you. Read the directions carefully, and ask your doctor or other care provider to review them with you.

## 2017-09-12 ENCOUNTER — MYC MEDICAL ADVICE (OUTPATIENT)
Dept: PEDIATRICS | Facility: CLINIC | Age: 67
End: 2017-09-12

## 2017-09-12 DIAGNOSIS — B37.31 VAGINAL YEAST INFECTION: ICD-10-CM

## 2017-09-12 RX ORDER — FLUCONAZOLE 150 MG/1
150 TABLET ORAL
Qty: 12 TABLET | Refills: 3 | Status: SHIPPED | OUTPATIENT
Start: 2017-09-12 | End: 2018-01-11

## 2017-10-30 DIAGNOSIS — E10.9 TYPE 1 DIABETES MELLITUS WITHOUT COMPLICATION (H): ICD-10-CM

## 2017-10-31 NOTE — TELEPHONE ENCOUNTER
Patient request refill. Medication pended and routed to provider for approval     Lab Results   Component Value Date    A1C 7.1 05/08/2017    A1C 6.9 12/29/2016    A1C 7.5 11/14/2016    A1C 7.6 04/18/2016    A1C 7.1 01/12/2016     LOV 9/5/17

## 2017-11-14 ENCOUNTER — TRANSFERRED RECORDS (OUTPATIENT)
Dept: HEALTH INFORMATION MANAGEMENT | Facility: CLINIC | Age: 67
End: 2017-11-14

## 2017-12-05 ENCOUNTER — OFFICE VISIT (OUTPATIENT)
Dept: ENDOCRINOLOGY | Facility: CLINIC | Age: 67
End: 2017-12-05
Payer: COMMERCIAL

## 2017-12-05 ENCOUNTER — TELEPHONE (OUTPATIENT)
Dept: DERMATOLOGY | Facility: CLINIC | Age: 67
End: 2017-12-05

## 2017-12-05 VITALS
SYSTOLIC BLOOD PRESSURE: 124 MMHG | BODY MASS INDEX: 20.18 KG/M2 | WEIGHT: 121.1 LBS | HEIGHT: 65 IN | DIASTOLIC BLOOD PRESSURE: 64 MMHG | TEMPERATURE: 98 F | OXYGEN SATURATION: 97 % | HEART RATE: 76 BPM

## 2017-12-05 DIAGNOSIS — Z79.4 LONG TERM CURRENT USE OF INSULIN (H): ICD-10-CM

## 2017-12-05 DIAGNOSIS — Z96.41 INSULIN PUMP IN PLACE: ICD-10-CM

## 2017-12-05 DIAGNOSIS — E10.9 TYPE 1 DIABETES MELLITUS WITHOUT COMPLICATION (H): ICD-10-CM

## 2017-12-05 DIAGNOSIS — E10.9 TYPE 1 DIABETES MELLITUS WITHOUT COMPLICATION (H): Primary | ICD-10-CM

## 2017-12-05 LAB — HBA1C MFR BLD: 7.5 % (ref 4.3–6)

## 2017-12-05 PROCEDURE — 82043 UR ALBUMIN QUANTITATIVE: CPT | Performed by: INTERNAL MEDICINE

## 2017-12-05 PROCEDURE — 99214 OFFICE O/P EST MOD 30 MIN: CPT | Performed by: INTERNAL MEDICINE

## 2017-12-05 PROCEDURE — 83036 HEMOGLOBIN GLYCOSYLATED A1C: CPT | Performed by: INTERNAL MEDICINE

## 2017-12-05 PROCEDURE — 36415 COLL VENOUS BLD VENIPUNCTURE: CPT | Performed by: INTERNAL MEDICINE

## 2017-12-05 NOTE — MR AVS SNAPSHOT
After Visit Summary   2017    Patricia Gage    MRN: [id number]           Patient Information     Date Of Birth          1950        Visit Information        Provider Department      2017 4:00 PM Tania Lang MD [facility]        Today's Diagnoses     Type 1 diabetes mellitus without complication (H)          Care Instructions    [facility] & [place] locations   Dr Lang, Endocrinology Department      [facility]   [address]  [place] [address]  Appointment [phone]  Fax: [phone]  [place]: Monday and Tuesday         [facility]   [address]  [place] [address]  Appointment [phone]  Fax: [phone]  [place]: Wednesday and Thursday          Please arrive 30 minutes before your scheduled appointment.   First go to the main floor lab suit  for previsit A1c lab appointment and then come upstairs and get checked in with  for clinic visit.  This will allow for your blood glucose meter/insulin pump to be downloaded and glycosylated hemoglobin (A1c) to be obtained prior to your appointment.      Increase in basal rate as discussed  Follow up in 3 months  Please make a lab appointment for blood work and follow up clinic appointment in 1 week after that to discuss results.    Recommend checking blood sugars before meals and at bedtime.    If Blood glucose are low more often-> 2-3 times/week- give us a call.  The patient is advised to Make better food choices: reduce carbs, Reduce portion size, weight loss and exercise 3-4 times a week.  Discussed hypoglycemia signs and symptoms as well as management in detail.                Follow-ups after your visit        Your next 10 appointments already scheduled     2018  8:15 AM CST   Return Visit with Emiliana Doshi MD   [facility]     "[address]  [address]  [place] [address] [phone]              Who to contact     If you have questions or need follow up information about today's clinic visit or your schedule please contact [facility] directly at [phone].  Normal or non-critical lab and imaging results will be communicated to you by MyChart, letter or phone within 4 business days after the clinic has received the results. If you do not hear from us within 7 days, please contact the clinic through [facility] or phone. If you have a critical or abnormal lab result, we will notify you by phone as soon as possible.  Submit refill requests through [employer] or call your pharmacy and they will forward the refill request to us. Please allow 3 business days for your refill to be completed.          Additional Information About Your Visit        [facility] Information     [employer] gives you secure access to your electronic health record. If you see a primary care provider, you can also send messages to your care team and make appointments. If you have questions, please call your primary care clinic.  If you do not have a primary care provider, please call [phone] and they will assist you.        Care EveryWhere ID     This is your Care EveryWhere ID. This could be used by other organizations to access your [facility] records  [id number]        Your Vitals Were     Pulse Temperature Height Pulse Oximetry BMI (Body Mass Index)       76 98  F (36.7  C) (Oral) 1.651 m (5' 5\") 97% 20.15 kg/m2        Blood Pressure from Last 3 Encounters:   12/05/17 124/64   09/05/17 112/64   06/13/17 108/62    Weight from Last 3 Encounters:   12/05/17 54.9 kg (121 lb 1.6 oz)   09/05/17 55.1 kg (121 lb 8 oz)   05/08/17 54.9 kg (121 lb)              Today, you had the following     No orders found for display         Today's Medication Changes          These changes are accurate as of: 12/5/17  4:45 PM.  If you have any " questions, ask your nurse or doctor.               These medicines have changed or have updated prescriptions.        Dose/Directions    * blood glucose monitoring test strip   Commonly known as:  no brand specified   This may have changed:  Another medication with the same name was changed. Make sure you understand how and when to take each.   Used for:  Type 1 diabetes mellitus without complication (H)   Changed by:  Tania Lang MD        Please dispense glucometer compatible strips. Check 8 times a day   Quantity:  300 each   Refills:  3       * blood glucose monitoring test strip   Commonly known as:  no brand specified   This may have changed:  Another medication with the same name was changed. Make sure you understand how and when to take each.   Used for:  Type 1 diabetes mellitus without complication (H)   Changed by:  Tania Lang MD        Use to test blood sugars 7 times daily or as directed   Quantity:  300 strip   Refills:  11       * blood glucose monitoring test strip   Commonly known as:  SUNIL CONTOUR NEXT   This may have changed:  See the new instructions.   Used for:  Type 1 diabetes mellitus without complication (H)   Changed by:  Tania Lang MD        CHECK  8  TIMES DAILY   Quantity:  900 strip   Refills:  1       insulin aspart 100 UNITS/ML injection   Commonly known as:  NovoLOG VIAL   This may have changed:  See the new instructions.   Used for:  Type 1 diabetes mellitus without complication (H)   Changed by:  Tania Lang MD        INJECT 35 UNITS SUBCUTANEOUSLY DAILY VIA INSULIN PUMP   Quantity:  30 mL   Refills:  1       * Notice:  This list has 3 medication(s) that are the same as other medications prescribed for you. Read the directions carefully, and ask your doctor or other care provider to review them with you.         Where to get your medicines      These medications were sent to [email or web address] - [place] - [address] [address]   [address] [facility] [address]     Phone:  [phone]     blood glucose monitoring test strip    insulin aspart 100 UNITS/ML injection                Primary Care Provider Office Phone # Fax #    Jenniffer Molina [phone] [address] [place] [address]        Equal Access to Services     [facility]: Hadii aad ku hadasho Soomaali, waaxda luqadaha, qaybta kaalmada adeegyada, waxay idiin hayaan adeeg kharash la'aan . So [facility] [phone].    ATENCIÓN: Si habla español, tiene a bazzi disposición servicios gratuitos de asistencia lingüística. [facility] [phone].    We comply with applicable federal civil rights laws and [place] laws. We do not discriminate on the basis of race, color, national origin, age, disability, sex, sexual orientation, or gender identity.            Thank you!     Thank you for choosing [facility]  for your care. Our goal is always to provide you with excellent care. Hearing back from our patients is one way we can continue to improve our services. Please take a few minutes to complete the written survey that you may receive in the mail after your visit with us. Thank you!             Your Updated Medication List - Protect others around you: Learn how to safely use, store and throw away your medicines at www.disposemymeds.org.          This list is accurate as of: 12/5/17  4:45 PM.  Always use your most recent med list.                   Brand Name Dispense Instructions for use Diagnosis    ASACOL  MG EC tablet   Generic drug:  mesalamine     180 tablet    3 tablets twice daily        aspirin 81 MG EC tablet     90 tablet    Take 1 tablet by mouth daily.    DM (diabetes mellitus), type 1 (H)       * blood glucose monitoring test strip    no brand specified    300 each    Please dispense glucometer compatible strips. Check 8 times a day    Type 1 diabetes mellitus without complication (H)       * blood glucose  monitoring test strip    no brand specified    300 strip    Use to test blood sugars 7 times daily or as directed    Type 1 diabetes mellitus without complication (H)       * blood glucose monitoring test strip    SUNIL CONTOUR NEXT    900 strip    CHECK  8  TIMES DAILY    Type 1 diabetes mellitus without complication (H)       buPROPion 150 MG 12 hr tablet    WELLBUTRIN SR    180 tablet    Take 1 tablet (150 mg) by mouth 2 times daily    Anxiety       butalbital-acetaminophen-caffeine -40 MG per tablet    FIORICET/ESGIC    30 tablet    Take 1 tablet by mouth every 4 hours as needed    Migraine without aura and without status migrainosus, not intractable       estradiol 0.1 MG/GM cream    ESTRACE VAGINAL    30 g    Place 2 g vaginally twice a week    Atrophic vaginitis       fluconazole 150 MG tablet    DIFLUCAN    12 tablet    Take 1 tablet (150 mg) by mouth every 7 days    Vaginal yeast infection       FLUoxetine 20 MG capsule    PROzac    90 capsule    Take 1 capsule (20 mg) by mouth daily    Anxiety       insulin aspart 100 UNITS/ML injection    NovoLOG VIAL    30 mL    INJECT 35 UNITS SUBCUTANEOUSLY DAILY VIA INSULIN PUMP    Type 1 diabetes mellitus without complication (H)       insulin infusion pump device      100 units/carb unit. 50 units SQ continuous        lisinopril 5 MG tablet    PRINIVIL/ZESTRIL    45 tablet    Take 0.5 tablets (2.5 mg) by mouth daily    Type 1 diabetes mellitus without complication (H)       MINIMED RESERVOIR 1.8ML Misc     30 each    1 each daily as needed Change insulin reservoir, set and site every 2-3 days    Type 1 diabetes mellitus without complication (H)       * nicotine 7 MG/24HR 24 hr patch    NICODERM CQ    30 patch    Place 1 patch onto the skin every 24 hours    Tobacco abuse       * nicotine 14 MG/24HR 24 hr patch    NICODERM CQ    30 patch    Place 1 patch onto the skin every 24 hours    Tobacco abuse       triamcinolone 0.1 % ointment    KENALOG    80 g    To  hand rash twice daily as needed.    Dermatitis       * Notice:  This list has 5 medication(s) that are the same as other medications prescribed for you. Read the directions carefully, and ask your doctor or other care provider to review them with you.

## 2017-12-05 NOTE — NURSING NOTE
"Chief Complaint   Patient presents with     RECHECK     follow up type 1 dm LOV 17       Initial There were no vitals taken for this visit. Estimated body mass index is 20.22 kg/(m^2) as calculated from the following:    Height as of 17: 1.651 m (5' 5\").    Weight as of 17: 55.1 kg (121 lb 8 oz).  Medication Reconciliation: complete     ENDOCRINOLOGY INTAKE FORM    Patient Name:  Patricia Gage  :  1950    Is patient Diabetic?   Yes: type 1  Does patient have non-diabetic or other endocrine issues?  No    Vitals: There were no vitals taken for this visit.  BMI= There is no height or weight on file to calculate BMI.    Flu vaccine:  No  Pneumonia vaccine:  Yes: 1/9/15    Smoking and Alcohol use:  Social History   Substance Use Topics     Smoking status: Former Smoker     Smokeless tobacco: Never Used     Alcohol use 0.0 oz/week     0 Standard drinks or equivalent per week      Comment: 5 drinks per week       Foot Exam: No  Eye Exam:  Oct  Dental Exam:  Yes:   Aspirin Use:  Yes: 81mg daily    Lab Results   Component Value Date    A1C 7.5 2017    A1C 7.1 2017    A1C 6.9 2016    A1C 7.5 2016    A1C 7.6 2016       Lab Results   Component Value Date    MICROL 8 2016     No results found for: MICROALBUMIN    Staff Signature:  Lanny Elaine CMA (AAMA)        "

## 2017-12-05 NOTE — PATIENT INSTRUCTIONS
[facility] & [facility]   Dr Lang, Endocrinology Department      [facility]   [address]  [place] [address]  Appointment [phone]  Fax: [phone]  [place]: Monday and Tuesday         [facility]   [address]  [place] [address]  Appointment [phone]  Fax: [phone]  [place]: Wednesday and Thursday          Please arrive 30 minutes before your scheduled appointment.   First go to the main floor lab suit  for previsit A1c lab appointment and then come upstairs and get checked in with  for clinic visit.  This will allow for your blood glucose meter/insulin pump to be downloaded and glycosylated hemoglobin (A1c) to be obtained prior to your appointment.      Increase in basal rate as discussed  Follow up in 3 months  Please make a lab appointment for blood work and follow up clinic appointment in 1 week after that to discuss results.    Recommend checking blood sugars before meals and at bedtime.    If Blood glucose are low more often-> 2-3 times/week- give us a call.  The patient is advised to Make better food choices: reduce carbs, Reduce portion size, weight loss and exercise 3-4 times a week.  Discussed hypoglycemia signs and symptoms as well as management in detail.

## 2017-12-05 NOTE — TELEPHONE ENCOUNTER
Reschedule appointment from 1/9/18 to an earlier/later date. Ok to double book/schedule on UBALDO or unavailable appointment time.

## 2017-12-05 NOTE — PROGRESS NOTES
Name: Patricia Gage  Seen for Diabetes.  BERTIN .  HPI:  Patricia Gage is a 66 year old female who presents for the evaluation/management of type 1 diabetes.  A1C      6.9   12/29/2016  A1C      7.5   11/14/2016  On 630 G- though not using sensors ( does not like adhesive)    H/o ulcerative colitis and was on prednisone for about 1 month Oct- Nov.  Off it X few weeks  Not on prednisone at this time  Plans to move to [place] next year    Has sensors at home but not using it.    1. Type 1 DM:  Orginally diagnosed at the age of 60 ( 10/2011).  DKA at diagnosis, sick at the time with vomiting and disorientation,, glucose of 800.Started on lantus and novolog, switched to insulin pump therapy in 2/12  Current Regimen:Humalog via 630 G insulin pump  BS checks: 8-10 per day  Average Meter Download: 174. Variable BG in AM. During day mostly high.    H/o Ulcerative colitis, former smoker.  Here for f/u, doing well.    Current Regimen: mostly in standard pattern. On days when she has wine she switch to pattern A and when on prednisone for UC switch to pattern B.  Insulin pump -   Time Rate (U/hr)   0000 0.425   0400 0.725   11:00 0.875   1500 0.125   1730 0.425         Carbohydrate Ratio -    Time Ratio   8735-0262 14   8534-8531 14   0418-2801 18   6332-1873 14     Sensitivity   58   Active Insulin Time  4 hours   Basal  46 %   Bolus   54 %   Total Carbohydrates/day  173   Total Insulin/day   27   Average Blood Sugar  174   BS Checks 9 times a day   Care Link Username-   Password-         Complications:   Diabetes Complications  Description / Detail    Diabetic Retinopathy  No   CAD / PAD  No   Neuropathy  No   Nephropathy / Microalbuminuria  No   Gastroparesis  No   Hypoglycemia [place]  No     2. Hypertension: Blood Pressure today:   BP Readings from Last 3 Encounters:   12/05/17 124/64   09/05/17 112/64   06/13/17 108/62    Takes medications everyday without forgetting a dose.  Denies feeling lightheaded or dizzy.   "Wakes up 0 times a night to urinate.  3. Hyperlipidemia:  Denies muscle aches of pains.       PMH/PSH:  Past Medical History:   Diagnosis Date     Basal cell carcinoma      Diabetes mellitus (H)      Myocarditis, unspecified 2011    at dx of type 1 DM, resolved after     Past Surgical History:   Procedure Laterality Date     MOHS MICROGRAPHIC PROCEDURE       Family Hx:  Family History   Problem Relation Age of Onset     Cardiovascular Father      father  of heart failure     Other - See Comments Sister      Tourettes     GASTROINTESTINAL DISEASE Sister      Celiac Sprue     CANCER No family hx of      no skin cancer       Social Hx:  Social History     Social History     Marital status:      Spouse name: N/A     Number of children: N/A     Years of education: N/A     Occupational History     Not on file.     Social History Main Topics     Smoking status: Former Smoker     Smokeless tobacco: Never Used     Alcohol use 0.0 oz/week     0 Standard drinks or equivalent per week      Comment: 5 drinks per week     Drug use: No     Sexual activity: Yes     Partners: Male     Birth control/ protection: Surgical     Other Topics Concern     Not on file     Social History Narrative          MEDICATIONS:  has a current medication list which includes the following prescription(s): fluoxetine, novolog vial, fluconazole, shea contour next, nicotine, blood glucose monitoring, lisinopril, minimed reservoir 1.8ml, blood glucose monitoring, bupropion, butalbital-acetaminophen-caffeine, nicotine, mesalamine, insulin infusion pump, aspirin, triamcinolone, and estradiol.    ROS     ROS: 10 point ROS neg other than the symptoms noted above in the HPI.    Physical Exam   VS: /64 (BP Location: Right arm, Patient Position: Chair, Cuff Size: Adult Regular)  Pulse 76  Temp 98  F (36.7  C) (Oral)  Ht 1.651 m (5' 5\")  Wt 54.9 kg (121 lb 1.6 oz)  SpO2 97%  BMI 20.15 kg/m2  GENERAL: AXOX3, NAD, well dressed, answering " questions appropriately, appears stated age.  HEENT: No exopthalmous, no proptosis, EOMI, no lig lag, no retraction  CV: RRR  LUNGS: CTAB, no wheezes, rales, or ronchi  MSK: grossly intact  SKIN: no rashes, no lesions  PSYCH: normal affect and mood    LABS:  A1c:  Lab Results   Component Value Date    A1C 7.1 05/08/2017    A1C 6.9 12/29/2016    A1C 7.5 11/14/2016    A1C 7.6 04/18/2016    A1C 7.1 01/12/2016       BMP:  Creatinine   Date Value Ref Range Status   12/29/2016 0.55 0.52 - 1.04 mg/dL Final     Urine Micro:  Lab Results   Component Value Date    UMALCR 14.73 12/29/2016      Diabetic Latest Ref Rng 1/12/2016   Microalb/creat urine 0 - 25 mg/g Cr 7.30     Diabetic Latest Ref Rng 1/7/2015   Microalb/creat urine 0 - 25 mg/g Cr 7.46       LFTs/Lipids:  Recent Labs   Lab Test  12/29/16   0726  01/28/16   0716  01/07/15   0705   01/22/14   0701   CHOL  170  175  167   --   153   HDL  75  69  71   --   60   LDL  85  97  87   < >  82   TRIG  51  46  45   --   53   CHOLHDLRATIO   --    --   2.4   --   2.5    < > = values in this interval not displayed.         Blood Glucose Meter reviewed.     All pertinent notes, labs, and images personally reviewed by me.     A/P  Ms.Ann ADAN Gage is a 65 year old here for the evaluation/management of diabetes:    1. DM1 - Under Fair control. No known complications.  Blood sugars were high in the setting of prednisone use for a flare of ulcerative colitis.  Off prednisone X few weeks. BG improving.    Plan:  Increase basal rate during day    Insulin pump - changes in basal 1    Time Rate (U/hr)   0000 0.650   0400 0.800   0530 0.900   1100 0.875--> 0.900   0300 PM 0.125--> 0.150   0600 PM     0.425--> 0.450     Carbohydrate Ratio -  No change  Time Ratio   0613-1899 14   6563-0486 14   0986-0585 18   7437-7324 14       Sensitivity   58      Prevention  Flu Shot-9/15  Pneumovax- 1/15  Opthalmology-Yes, 4/16  Dental-Yes  ASA-Yes  Smoking- No, former smoker    2. Hypertension - Under  Good  control. Blood pressure medications include lisinopril 5 mg taking for renal protection. Continue current medical therapy.    3. Hyperlipidemia - Under Good control. ON  lipitor 40 mg.  -- recheck lipid panel in next visit    4. Other.  Patients with Type 1 DM are at risk for other autoimmune diseases.  She is at increase risk for thyroid disorder by 20% and increase risk for Celiac Sprue by 5-10%. TFT' are normal. Celiac sprue  Testing: negative.      Follow-up:  follow up in 3 month(s)    Tania Lang MD  Endocrinology   [facility]  CC: Jenniffer Molina    More than 50% of face to face time spent with Ms. Gage on counseling / coordinating her care.  Total face to face time was greater than 20 minutes.     All questions were answered.  The patient indicates understanding of the above issues and agrees with the plan set forth.     Addendum to above note and clinic visit:    Labs reviewed.    See result note/telephone encounter.

## 2017-12-06 LAB
CREAT UR-MCNC: 66 MG/DL
MICROALBUMIN UR-MCNC: 11 MG/L
MICROALBUMIN/CREAT UR: 16.54 MG/G CR (ref 0–25)

## 2018-01-08 ASSESSMENT — ACTIVITIES OF DAILY LIVING (ADL)
CURRENT_FUNCTION: NO ASSISTANCE NEEDED
I_NEED_ASSISTANCE_FOR_THE_FOLLOWING_DAILY_ACTIVITIES:: NO ASSISTANCE IS NEEDED

## 2018-01-11 ENCOUNTER — OFFICE VISIT (OUTPATIENT)
Dept: PEDIATRICS | Facility: CLINIC | Age: 68
End: 2018-01-11
Payer: COMMERCIAL

## 2018-01-11 VITALS
WEIGHT: 120 LBS | SYSTOLIC BLOOD PRESSURE: 118 MMHG | BODY MASS INDEX: 19.99 KG/M2 | TEMPERATURE: 97.8 F | HEIGHT: 65 IN | DIASTOLIC BLOOD PRESSURE: 62 MMHG | HEART RATE: 68 BPM

## 2018-01-11 DIAGNOSIS — N95.2 ATROPHIC VAGINITIS: ICD-10-CM

## 2018-01-11 DIAGNOSIS — F41.9 ANXIETY: ICD-10-CM

## 2018-01-11 DIAGNOSIS — G43.009 MIGRAINE WITHOUT AURA AND WITHOUT STATUS MIGRAINOSUS, NOT INTRACTABLE: ICD-10-CM

## 2018-01-11 DIAGNOSIS — Z87.891 HISTORY OF TOBACCO USE: ICD-10-CM

## 2018-01-11 DIAGNOSIS — Z78.0 ASYMPTOMATIC MENOPAUSAL STATE: ICD-10-CM

## 2018-01-11 DIAGNOSIS — Z00.00 ROUTINE GENERAL MEDICAL EXAMINATION AT A HEALTH CARE FACILITY: Primary | ICD-10-CM

## 2018-01-11 DIAGNOSIS — E10.9 TYPE 1 DIABETES MELLITUS WITHOUT COMPLICATION (H): ICD-10-CM

## 2018-01-11 DIAGNOSIS — Z23 NEED FOR PROPHYLACTIC VACCINATION AGAINST STREPTOCOCCUS PNEUMONIAE (PNEUMOCOCCUS): ICD-10-CM

## 2018-01-11 DIAGNOSIS — B37.31 VAGINAL YEAST INFECTION: ICD-10-CM

## 2018-01-11 PROCEDURE — 90670 PCV13 VACCINE IM: CPT | Performed by: INTERNAL MEDICINE

## 2018-01-11 PROCEDURE — 99397 PER PM REEVAL EST PAT 65+ YR: CPT | Mod: 25 | Performed by: INTERNAL MEDICINE

## 2018-01-11 PROCEDURE — G0296 VISIT TO DETERM LDCT ELIG: HCPCS | Performed by: INTERNAL MEDICINE

## 2018-01-11 PROCEDURE — 90471 IMMUNIZATION ADMIN: CPT | Performed by: INTERNAL MEDICINE

## 2018-01-11 RX ORDER — BUTALBITAL, ACETAMINOPHEN AND CAFFEINE 50; 325; 40 MG/1; MG/1; MG/1
1 TABLET ORAL EVERY 4 HOURS PRN
Qty: 30 TABLET | Refills: 5 | Status: SHIPPED | OUTPATIENT
Start: 2018-01-11

## 2018-01-11 RX ORDER — LISINOPRIL 5 MG/1
2.5 TABLET ORAL DAILY
Qty: 45 TABLET | Refills: 3 | Status: SHIPPED | OUTPATIENT
Start: 2018-01-11

## 2018-01-11 RX ORDER — FLUCONAZOLE 150 MG/1
150 TABLET ORAL
Qty: 12 TABLET | Refills: 3 | Status: SHIPPED | OUTPATIENT
Start: 2018-01-11

## 2018-01-11 RX ORDER — MESALAMINE 1000 MG/1
1000 SUPPOSITORY RECTAL AT BEDTIME
Qty: 30 SUPPOSITORY | Refills: 1 | COMMUNITY
Start: 2018-01-11

## 2018-01-11 RX ORDER — ESTRADIOL 0.1 MG/G
2 CREAM VAGINAL
Qty: 30 G | Refills: 9 | Status: SHIPPED | OUTPATIENT
Start: 2018-01-11

## 2018-01-11 RX ORDER — BUPROPION HYDROCHLORIDE 150 MG/1
150 TABLET, EXTENDED RELEASE ORAL 2 TIMES DAILY
Qty: 180 TABLET | Refills: 3 | Status: SHIPPED | OUTPATIENT
Start: 2018-01-11

## 2018-01-11 ASSESSMENT — ACTIVITIES OF DAILY LIVING (ADL): CURRENT_FUNCTION: NO ASSISTANCE NEEDED

## 2018-01-11 NOTE — MR AVS SNAPSHOT
After Visit Summary   1/11/2018    Patricia Gage    MRN: [id number]           Patient Information     Date Of Birth          1950        Visit Information        Provider Department      1/11/2018 7:40 AM Jenniffer Molina MD [facility]        Today's Diagnoses     Routine general medical examination at a health care facility    -  1    Type 1 diabetes mellitus without complication (H)        Anxiety        Migraine without aura and without status migrainosus, not intractable        Atrophic vaginitis        Need for prophylactic vaccination against Streptococcus pneumoniae (pneumococcus)        History of tobacco use        Asymptomatic menopausal state        Vaginal yeast infection          Care Instructions    Schedule an appointment with gastroenterologist and talk with them about getting hemorrhoidectomy    Schedule DEXA (bone density) scan.         Follow up for annual care or as needed     Lung Cancer Screening   Frequently Asked Questions  If you are at high-risk for lung cancer, getting screened with low-dose computed tomography (LDCT) every year can help save your life. This handout offers answers to some of the most common questions about lung cancer screening. If you have other questions, please call [phone] ([phone]).     What is it?  Lung cancer screening uses special X-ray technology to create an image of your lung tissue. The exam is quick and easy and takes less than 10 seconds. We don t give you any medicine or use any needles. You can eat before and after the exam. You don t need to change your clothes as long as the clothing on your chest doesn t contain metal. But, you do need to be able to hold your breath for at least 6 seconds during the exam.    What is the goal of lung cancer screening?  The goal of lung cancer screening is to save lives. Many times, lung cancer is not found until a person starts having physical symptoms. Lung cancer  screening can help detect lung cancer in the earliest stages when it may be easier to treat.    Who should be screened for lung cancer?  We suggest lung cancer screening for anyone who is at high-risk for lung cancer. You are in the high-risk group if you:      are between the ages of 55 and 79, and    have smoked at least 1 pack of cigarettes a day for 30 or more years, and    still smoke or have quit within the past 15 years.    However, if you have a new cough or shortness of breath, you should talk to your doctor before being screened.    Some national lung health advocacy groups also recommend screening for people ages 50 to 79 who have smoked an average of 1 pack of cigarettes a day for 20 years. They must also have at least 1 other risk factor for lung cancer, not including exposure to secondhand smoke. Other risk factors are having had cancer in the past, emphysema, pulmonary fibrosis, COPD, a family history of lung cancer, or exposure to certain materials such as arsenic, asbestos, beryllium, cadmium, chromium, diesel fumes, nickel, radon or silica. Your care team can help you know if you have one of these risk factors.     Why does it matter if I have symptoms?  Certain symptoms can be a sign that you have a condition in your lungs that should be checked and treated by your doctor. These symptoms include fever, chest pain, a new or changing cough, shortness of breath that you have never felt before, coughing up blood or unexplained weight loss. Having any of these symptoms can greatly affect the results of lung cancer screening.       Should all smokers get an LDCT lung cancer screening exam?  It depends. Lung cancer screening is for a very specific group of men and women who have a history of heavy smoking over a long period of time (see  Who should be screened for lung cancer  above).  I am in the high-risk group, but have been diagnosed with cancer in the past. Is LDCT lung cancer screening right for  me?  In some cases, you should not have LDCT lung screening, such as when your doctor is already following your cancer with CT scan studies. Your doctor will help you decide if LDCT lung screening is right for you.  Do I need to have a screening exam every year?  Yes. If you are in the high-risk group described earlier, you should get an LDCT lung cancer screening exam every year until you are 79, or are no longer willing or able to undergo screening and possible procedures to diagnose and treat lung cancer.  How effective is LDCT at preventing death from lung cancer?  Studies have shown that LDCT lung cancer screening can lower the risk of death from lung cancer by 20 percent in people who are at high-risk.  What are the risks?  There are some risks and limitations of LDCT lung cancer screening. We want to make sure you understand the risks and benefits, so please let us know if you have any questions. Your doctor may want to talk with you more about these risks.    Radiation exposure: As with any exam that uses radiation, there is a very small increased risk of cancer. The amount of radiation in LDCT is small--about the same amount a person would get from a mammogram. Your doctor orders the exam when he or she feels the potential benefits outweigh the risks.    False negatives: No test is perfect, including LDCT. It is possible that you may have a medical condition, including lung cancer, that is not found during your exam. This is called a false negative result.    False positives and more testing: LDCT very often finds something in the lung that could be cancer, but in fact is not. This is called a false positive result. False positive tests often cause anxiety. To make sure these findings are not cancer, you may need to have more tests. These tests will be done only if you give us permission. Sometimes patients need a treatment that can have side effects, such as a biopsy. For more information on false  positives, see  What can I expect from the results?     Findings not related to lung cancer: Your LDCT exam also takes pictures of areas of your body next to your lungs. In a very small number of cases, the CT scan will show an abnormal finding in one of these areas, such as your kidneys, adrenal glands, liver or thyroid. This finding may not be serious, but you may need more tests. Your doctor can help you decide what other tests you may need, if any.  What can I expect from the results?  About 1 out of 4 LDCT exams will find something that may need more tests. Most of the time, these findings are lung nodules. Lung nodules are very small collections of tissue in the lung. These nodules are very common, and the vast majority--more than 97 percent--are not cancer (benign). Most are normal lymph nodes or small areas of scarring from past infections.  But, if a small lung nodule is found to be cancer, the cancer can be cured more than 90 percent of the time. To know if the nodule is cancer, we may need to get more images before your next yearly screening exam. If the nodule has suspicious features (for example, it is large, has an odd shape or grows over time), we will refer you to a specialist for further testing.  Will my doctor also get the results?  Yes. Your doctor will get a copy of your results.  Is it okay to keep smoking now that there s a cancer screening exam?  No. Tobacco is one of the strongest cancer-causing agents. It causes not only lung cancer, but other cancers and cardiovascular (heart) diseases as well. The damage caused by smoking builds over time. This means that the longer you smoke, the higher your risk of disease. While it is never too late to quit, the sooner you quit, the better.  Where can I find help to quit smoking?  The best way to prevent lung cancer is to stop smoking. If you have already quit smoking, congratulations and keep it up! For help on quitting smoking, please call [employer]  at 1-613-200-QXYA (5922) or the American Cancer Society at 1-126.242.9142 to find local resources near you.  One-on-one health coaching:  If you d prefer to work individually with a health care provider on tobacco cessation, we offer:      Medication Therapy Management:  Our specially trained pharmacists work closely with you and your doctor to help you quit smoking.  Call [phone] or [phone] (toll free).     Can Do: Health coaching offered by [facility].  [email or web address]    Preventive Health Recommendations    Female Ages 65 +    Yearly exam:     See your health care provider every year in order to  o Review health changes.   o Discuss preventive care.    o Review your medicines if your doctor has prescribed any.      You no longer need a yearly Pap test unless you've had an abnormal Pap test in the past 10 years. If you have vaginal symptoms, such as bleeding or discharge, be sure to talk with your provider about a Pap test.      Every 1 to 2 years, have a mammogram.  If you are over 69, talk with your health care provider about whether or not you want to continue having screening mammograms.      Every 10 years, have a colonoscopy. Or, have a yearly FIT test (stool test). These exams will check for colon cancer.       Have a cholesterol test every 5 years, or more often if your doctor advises it.       Have a diabetes test (fasting glucose) every three years. If you are at risk for diabetes, you should have this test more often.       At age 65, have a bone density scan (DEXA) to check for osteoporosis (brittle bone disease).    Shots:    Get a flu shot each year.    Get a tetanus shot every 10 years.    Talk to your doctor about your pneumonia vaccines. There are now two you should receive - Pneumovax (PPSV 23) and Prevnar (PCV 13).    Talk to your doctor about the shingles vaccine.    Talk to your doctor about the hepatitis B vaccine.    Nutrition:     Eat at least 5 servings  of fruits and vegetables each day.      Eat whole-grain bread, whole-wheat pasta and brown rice instead of white grains and rice.      Talk to your provider about Calcium and Vitamin D.     Lifestyle    Exercise at least 150 minutes a week (30 minutes a day, 5 days a week). This will help you control your weight and prevent disease.      Limit alcohol to one drink per day.      No smoking.       Wear sunscreen to prevent skin cancer.       See your dentist twice a year for an exam and cleaning.      See your eye doctor every 1 to 2 years to screen for conditions such as glaucoma, macular degeneration and cataracts.          Follow-ups after your visit        Your next 10 appointments already scheduled     Feb 06, 2018  9:00 AM CST   [facility]    [address] [place] [address]  [address]  [facility] [id number] [phone]            Mar 05, 2018  4:00 PM CST   Return Visit with Tania Lang MD   [facility])    [address] [place] [address]  [address]  [facility] [id number] [phone]              Future tests that were ordered for you today     Open Future Orders        Priority Expected Expires Ordered    DX Hip/Pelvis/Spine Routine  1/11/2019 1/11/2018    CT Chest Lung Cancer Scrn Low Dose wo Routine  1/11/2019 1/11/2018    Lipid panel reflex to direct LDL Fasting Routine  3/11/2018 1/11/2018    Comprehensive metabolic panel Routine  3/11/2018 1/11/2018    CT Chest Lung Cancer Scrn Low Dose wo Routine  1/11/2019 1/11/2018            Who to contact     If you have questions or need follow up information about today's clinic visit or your schedule please contact [facility] directly at [phone].  Normal or non-critical lab and imaging results will be communicated to you by MyChart, letter or phone within 4 business days after the clinic has received the results. If you do  "not hear from us within 7 days, please contact the clinic through [employer] or phone. If you have a critical or abnormal lab result, we will notify you by phone as soon as possible.  Submit refill requests through [employer] or call your pharmacy and they will forward the refill request to us. Please allow 3 business days for your refill to be completed.          Additional Information About Your Visit        Z[employer] Information     [employer] gives you secure access to your electronic health record. If you see a primary care provider, you can also send messages to your care team and make appointments. If you have questions, please call your primary care clinic.  If you do not have a primary care provider, please call [phone] and they will assist you.        Care EveryWhere ID     This is your Care EveryWhere ID. This could be used by other organizations to access your [facility] medical records  [id number]        Your Vitals Were     Pulse Temperature Height BMI (Body Mass Index)          68 97.8  F (36.6  C) (Oral) 5' 5\" (1.651 m) 19.97 kg/m2         Blood Pressure from Last 3 Encounters:   01/11/18 118/62   12/05/17 124/64   09/05/17 112/64    Weight from Last 3 Encounters:   01/11/18 120 lb (54.4 kg)   12/05/17 121 lb 1.6 oz (54.9 kg)   09/05/17 121 lb 8 oz (55.1 kg)              We Performed the Following     ADMIN: Vaccine, Initial (72227)     Okay for Smoking Cessation Study (PLUTO) to Contact Patient     Pneumococcal vaccine 13 valent PCV13 IM (Prevnar) [22950]     Prof fee: Shared Decisionmaking for Lung Cancer Screening          Today's Medication Changes          These changes are accurate as of: 1/11/18  8:23 AM.  If you have any questions, ask your nurse or doctor.               These medicines have changed or have updated prescriptions.        Dose/Directions    nicotine 14 MG/24HR 24 hr patch   Commonly known as:  NICODERM CQ   This may have changed:  Another medication with the same name was removed. " Continue taking this medication, and follow the directions you see here.   Used for:  Tobacco abuse   Changed by:  Jenniffer Molina MD        Dose:  1 patch   Place 1 patch onto the skin every 24 hours   Quantity:  30 patch   Refills:  3            Where to get your medicines      These medications [place] [facility] [place] [address] [facility]  [address] [facility] [address]     Phone:  [phone]     buPROPion 150 MG 12 hr tablet    estradiol 0.1 MG/GM cream    fluconazole 150 MG tablet    FLUoxetine 20 MG capsule    lisinopril 5 MG tablet         Some of these will need a paper prescription and others can be bought over the counter.  Ask your nurse if you have questions.     Bring a paper prescription for each of these medications     butalbital-acetaminophen-caffeine -40 MG per tablet                Primary Care Provider Office Phone # Fax #    Jenniffer Molina [phone] [address] [place] [address]        Equal Access to Services     [facility] AH: Hadii aad ku hadasho Soomaali, waaxda luqadaha, qaybta kaalmada adeegyada, waxay idiin hayaan adeeg kharayosef johnson . So [facility] [phone].    ATENCIÓN: Si habla español, tiene a bazzi disposición servicios gratuitos de asistencia lingüística. [facility] [phone].    We comply with applicable federal civil rights laws and [place] laws. We do not discriminate on the basis of race, color, national origin, age, disability, sex, sexual orientation, or gender identity.            Thank you!     [facility] you [facility]  [facility] your care. Our goal is always to provide you with excellent care. Hearing back from our patients is one way we can continue to improve our services. Please take a few minutes to complete the written survey that you may receive in the mail after your visit with us. Thank you!             Your Updated Medication List - Protect  others around you: Learn how to safely use, store and throw away your medicines at www.disposemymeds.org.          This list is accurate as of: 1/11/18  8:23 AM.  Always use your most recent med list.                   Brand Name Dispense Instructions for use Diagnosis    * ASACOL  MG EC tablet   Generic drug:  mesalamine     180 tablet    3 tablets twice daily        * CANASA 1000 MG Suppository   Generic drug:  mesalamine     30 suppository    Place 1 suppository (1,000 mg) rectally At Bedtime        aspirin 81 MG EC tablet     90 tablet    Take 1 tablet by mouth daily.    DM (diabetes mellitus), type 1 (H)       * blood glucose monitoring test strip    no brand specified    300 each    Please dispense glucometer compatible strips. Check 8 times a day    Type 1 diabetes mellitus without complication (H)       * blood glucose monitoring test strip    no brand specified    300 strip    Use to test blood sugars 7 times daily or as directed    Type 1 diabetes mellitus without complication (H)       * blood glucose monitoring test strip    SUNIL CONTOUR NEXT    900 strip    CHECK  8  TIMES DAILY    Type 1 diabetes mellitus without complication (H)       buPROPion 150 MG 12 hr tablet    WELLBUTRIN SR    180 tablet    Take 1 tablet (150 mg) by mouth 2 times daily    Anxiety       butalbital-acetaminophen-caffeine -40 MG per tablet    FIORICET/ESGIC    30 tablet    Take 1 tablet by mouth every 4 hours as needed    Migraine without aura and without status migrainosus, not intractable       estradiol 0.1 MG/GM cream    ESTRACE VAGINAL    30 g    Place 2 g vaginally twice a week    Atrophic vaginitis       fluconazole 150 MG tablet    DIFLUCAN    12 tablet    Take 1 tablet (150 mg) by mouth every 7 days    Vaginal yeast infection       FLUoxetine 20 MG capsule    PROzac    90 capsule    Take 1 capsule (20 mg) by mouth daily    Anxiety       insulin aspart 100 UNITS/ML injection    NovoLOG VIAL    30 mL    INJECT  35 UNITS SUBCUTANEOUSLY DAILY VIA INSULIN PUMP    Type 1 diabetes mellitus without complication (H)       insulin infusion pump device      100 units/carb unit. 50 units SQ continuous        lisinopril 5 MG tablet    PRINIVIL/ZESTRIL    45 tablet    Take 0.5 tablets (2.5 mg) by mouth daily    Type 1 diabetes mellitus without complication (H)       MINIMED RESERVOIR 1.8ML Misc     30 each    1 each daily as needed Change insulin reservoir, set and site every 2-3 days    Type 1 diabetes mellitus without complication (H)       nicotine 14 MG/24HR 24 hr patch    NICODERM CQ    30 patch    Place 1 patch onto the skin every 24 hours    Tobacco abuse       triamcinolone 0.1 % ointment    KENALOG    80 g    To hand rash twice daily as needed.    Dermatitis       * Notice:  This list has 5 medication(s) that are the same as other medications prescribed for you. Read the directions carefully, and ask your doctor or other care provider to review them with you.

## 2018-01-11 NOTE — PROGRESS NOTES
"SUBJECTIVE:   Patricia Gage is a 67 year old female who presents for Preventive Visit.    Patricia is a patient with a complex PMHx who presents to the clinic for her annual physical. Reports her  wants her to be tested for ADHD; she notes having troubles finding words at time but has asked other women and they say it's normal. Her  feels she moves quickly and talks too fast. She reports this is the way she has always been. Notes there is some anxiety since her  makes her more anxious since he \"watches\" her.     She reports she has always smoked off and on her entire life; recently she has been smoking more frequently (1-2 cigarettes per day). Patient is worried about lung cancer and would like to get testing done. Notes she quit many times, but most recently cut back to 1-2 cigarettes. Prior to this she was smoking 1 ppd x 30 years.     Patient states she had a flare of ulcerative colitis a few weeks ago and bleeding continued. She believes she has some hemorrhoids. She was placed on cansasa suppositories which seemed to have helped.       BP in clinic was 118/62; weight was 120 lbs.         Are you in the first 12 months of your Medicare coverage?  Yes,  Visual Acuity:  Right Eye: 20/20   Left Eye: 20/20  Both Eyes: 20/20    Physical   Annual:     Getting at least 3 servings of Calcium per day::  Yes    Bi-annual eye exam::  Yes    Dental care twice a year::  Yes    Sleep apnea or symptoms of sleep apnea::  None    Diet::  Carbohydrate counting    Frequency of exercise::  2-3 days/week    Duration of exercise::  15-30 minutes    Taking medications regularly::  Yes    Medication side effects::  None    Ability to successfully perform activities of daily living: no assistance needed  Home Safety:  No safety concerns identified  Hearing Impairment: no hearing concerns        Fall risk:  Fallen 2 or more times in the past year?: No  Any fall with injury in the past year?: No      COGNITIVE SCREEN  1) " Repeat 3 items (Banana, Sunrise, Chair)    2) Clock draw: NORMAL  3) 3 item recall: Recalls 2 objects   Results: NORMAL clock, 1-2 items recalled: COGNITIVE IMPAIRMENT LESS LIKELY    Mini-CogTM Copyright RON Viera. Licensed by the author for use in [facility]; reprinted with permission ([email or web address]). All rights reserved.          Reviewed and updated as needed this visit by clinical staffTobao  Meds         Reviewed and updated as needed this visit by Provider        Social History   Substance Use Topics     Smoking status: Former Smoker     Smokeless tobacco: Never Used     Alcohol use 0.0 oz/week     0 Standard drinks or equivalent per week      Comment: 5 drinks per week       Alcohol Use 1/8/2018   If you drink alcohol, do you typically have greater than 3 drinks per day OR greater than 7 drinks per week?   No             Diabetes Follow-up    Patient is checking blood sugars: 8-10 times daily.    Blood sugar testing frequency justification: Uncontrolled diabetes  Results are as follows:          Diabetic concerns: None     Symptoms of hypoglycemia (low blood sugar): none     Paresthesias (numbness or burning in feet) or sores: No     Date of last diabetic eye exam:   BP Readings from Last 2 Encounters:   01/11/18 118/62   12/05/17 124/64     Hemoglobin A1C (%)   Date Value   12/05/2017 7.5 (H)   05/08/2017 7.1 (H)     LDL Cholesterol Calculated (mg/dL)   Date Value   12/29/2016 85   01/28/2016 97   Migraine Follow-Up    Headaches symptoms:  Stable     Frequency: quite often     Duration of headaches:     Able to do normal daily activities/work with migraines: Yes    Rescue/Relief medication:Tylenol              Effectiveness: moderate relief    Preventative medication: None    Neurologic complications: No new stroke-like symptoms, loss of vision or speech, numbness or weakness    In the past 4 weeks, how often have you gone to Urgent Care or the emergency room because of your headaches?   0      Anxiety Follow-Up    Status since last visit: No change    Other associated symptoms:None    Complicating factors:   Significant life event: No   Current substance abuse: None  Depression symptoms: No  MAGGIE-7 SCORE 1/9/2015 1/14/2016 1/5/2017   Total Score 1 - -   Total Score - 4 3       GAD7                  Today's PHQ-2 Score:   PHQ-2 ( 1999 [employer]) 1/8/2018   Q1: Little interest or pleasure in doing things 0   Q2: Feeling down, depressed or hopeless 0   PHQ-2 Score 0   Q1: Little interest or pleasure in doing things Not at all   Q2: Feeling down, depressed or hopeless Not at all   PHQ-2 Score 0       Do you feel safe in your environment - Yes    Do you have a Health Care Directive?: No: Advance care planning was reviewed with patient; patient declined at this time.      Current providers sharing in care for this patient include: Patient Care Team:  Jenniffer Molina MD as PCP - General (Pediatrics)  Madhu, Brittany Dumont PA-C as Physician Assistant (Physician Assistant)  Ana Samson MD as MD    The following health maintenance items are reviewed in Epic and correct as of today:  Health Maintenance   Topic Date Due     FOOT EXAM Q1 YEAR  01/14/2017     DEXA Q5 YR  07/01/2017     INFLUENZA VACCINE (SYSTEM ASSIGNED)  09/01/2017     PNEUMOCOCCAL (1 of 2 - PCV13) 12/13/2017     CMP Q1 YR  12/29/2017     LIPID MONITORING Q1 YEAR  12/29/2017     FALL RISK ASSESSMENT  12/13/2017     PAP Q3 YR  01/09/2018     A1C Q6 MO  06/05/2018     EYE EXAM Q1 YEAR  10/01/2018     MICROALBUMIN Q1 YEAR  12/05/2018     TSH W/ FREE T4 REFLEX Q2 YEAR  12/29/2018     MAMMO SCREEN Q2 YR (SYSTEM ASSIGNED)  07/18/2019     PNEUMO 5YR BOOST DUE AFTER AGE 65 (NO IB MSG) (2) 01/09/2020     ADVANCE DIRECTIVE PLANNING Q5 YRS  01/05/2022     TETANUS IMMUNIZATION (SYSTEM ASSIGNED)  01/09/2024     COLON CANCER SCREEN (SYSTEM ASSIGNED)  03/27/2027     MIGRAINE ACTION PLAN  Completed     HEPATITIS C SCREENING  Completed     Labs  reviewed in EPIC  BP Readings from Last 3 Encounters:   18 118/62   17 124/64   17 112/64    Wt Readings from Last 3 Encounters:   18 54.4 kg (120 lb)   17 54.9 kg (121 lb 1.6 oz)   17 55.1 kg (121 lb 8 oz)                  Patient Active Problem List   Diagnosis     Left sided ulcerative (chronic) colitis (H)     CARDIOVASCULAR SCREENING; LDL GOAL LESS THAN 100     Advanced directives, counseling/discussion     Anxiety     Migraine     Menopause     Long term current use of insulin (H)     Tobacco abuse     Hx of basal cell carcinoma     Inflamed seborrheic keratosis     Seborrheic keratosis     Solar lentiginosis     Skin cancer screening     Cherry angioma     Type 1 diabetes mellitus without complication (H)     Insulin pump in place     Past Surgical History:   Procedure Laterality Date     MOHS MICROGRAPHIC PROCEDURE         Social History   Substance Use Topics     Smoking status: Former Smoker     Smokeless tobacco: Never Used     Alcohol use 0.0 oz/week     0 Standard drinks or equivalent per week      Comment: 5 drinks per week     Family History   Problem Relation Age of Onset     Cardiovascular Father      father  of heart failure     Other - See Comments Sister      Tourettes     GASTROINTESTINAL DISEASE Sister      Celiac Sprue     CANCER No family hx of      no skin cancer         Current Outpatient Prescriptions   Medication Sig Dispense Refill     insulin aspart (NOVOLOG VIAL) 100 UNITS/ML injection INJECT 35 UNITS SUBCUTANEOUSLY DAILY VIA INSULIN PUMP 30 mL 1     blood glucose monitoring (SUNIL CONTOUR NEXT) test strip CHECK  8  TIMES DAILY 900 strip 1     FLUoxetine (PROZAC) 20 MG capsule Take 1 capsule (20 mg) by mouth daily 90 capsule 0     fluconazole (DIFLUCAN) 150 MG tablet Take 1 tablet (150 mg) by mouth every 7 days 12 tablet 3     triamcinolone (KENALOG) 0.1 % ointment To hand rash twice daily as needed. 80 g 1     nicotine (NICODERM CQ) 14 MG/24HR  24 hr patch Place 1 patch onto the skin every 24 hours 30 patch 3     blood glucose monitoring (NO BRAND SPECIFIED) test strip Use to test blood sugars 7 times daily or as directed 300 strip 11     lisinopril (PRINIVIL/ZESTRIL) 5 MG tablet Take 0.5 tablets (2.5 mg) by mouth daily 45 tablet 3     Insulin Infusion Pump Supplies (MINIMED RESERVOIR 1.8ML) MISC 1 each daily as needed Change insulin reservoir, set and site every 2-3 days 30 each 1     blood glucose monitoring (NO BRAND SPECIFIED) test strip Please dispense glucometer compatible strips. Check 8 times a day 300 each 3     buPROPion (WELLBUTRIN SR) 150 MG 12 hr tablet Take 1 tablet (150 mg) by mouth 2 times daily 180 tablet 3     butalbital-acetaminophen-caffeine (FIORICET/ESGIC) -40 MG per tablet Take 1 tablet by mouth every 4 hours as needed 30 tablet 5     nicotine (NICODERM CQ) 7 MG/24HR patch 2h hr Place 1 patch onto the skin every 24 hours 30 patch 1     estradiol (ESTRACE VAGINAL) 0.1 MG/GM vaginal cream Place 2 g vaginally twice a week 30 g 9     mesalamine (ASACOL HD) 800 MG EC tablet 3 tablets twice daily 180 tablet      Insulin Infusion Pump (PARADIGM REVEL INSULIN PUMP) LINH 100 units/carb unit. 50 units SQ continuous       aspirin 81 MG EC tablet Take 1 tablet by mouth daily. 90 tablet 3     No Known Allergies  Recent Labs   Lab Test  12/05/17   1533  05/08/17   1649  12/29/16   0726  04/19/16 01/28/16   0716   01/07/15   0705   A1C  7.5*  7.1*  6.9*   < >   --    < >   --    < >  7.2*   LDL   --    --   85   --    --    --   97   --   87   HDL   --    --   75   --    --    --   69   --   71   TRIG   --    --   51   --    --    --   46   --   45   ALT   --    --   19   --   29   --   22   --   21   CR   --    --   0.55   --   0.56   --   0.48*   < >  0.51*   GFRESTIMATED   --    --   >90  Non  GFR Calc     --   >60   --   >90  Non  GFR Calc     < >  >90  Non  GFR Calc     GFRESTBLACK   --    " --   >90   GFR Calc     --   >60   --   >90   GFR Calc     < >  >90   GFR Calc     POTASSIUM   --    --   4.0   --    --    --   3.8   < >  3.8   TSH   --    --   1.52   --    --    --    --    --   1.68    < > = values in this interval not displayed.              Pneumonia Vaccine:Adults age 65+ who received their first dose of Pneumovax (PPSV23) prior to age 65 years: Should be given PCV 13 > 1 year after their most recent PPSV23 AND should be given a another dose of PPSV23 > 5 years after their most recent dose of PPSV23  Mammogram Screening: Patient over age 50, mutual decision to screen reflected in health maintenance.    History of abnormal Pap smear: NO - age 65 - see link Cervical Cytology Screening Guidelines  Review of Systems  Constitutional, HEENT, cardiovascular, pulmonary, GI, , musculoskeletal, neuro, skin, endocrine and psych systems are negative, except as otherwise noted.    This document serves as a record of the services and decisions personally performed and made by Jenniffer Molina MD. It was created on her behalf by Candi Mcgregor, a trained medical scribe. The creation of this document is based the provider's statements to the medical scribe.    Candi Mcgregor January 11, 2018 7:59 AM  OBJECTIVE:   /62 (Cuff Size: Adult Regular)  Pulse 68  Temp 97.8  F (36.6  C) (Oral)  Ht 5' 5\" (1.651 m)  Wt 120 lb (54.4 kg)  BMI 19.97 kg/m2 Estimated body mass index is 19.97 kg/(m^2) as calculated from the following:    Height as of this encounter: 5' 5\" (1.651 m).    Weight as of this encounter: 120 lb (54.4 kg).  Physical Exam  GENERAL APPEARANCE: healthy, alert and no distress  EYES: Eyes grossly normal to inspection, PERRL and conjunctivae and sclerae normal  HENT: ear canals and TM's normal, nose and mouth without ulcers or lesions, oropharynx clear and oral mucous membranes moist  NECK: no adenopathy, no asymmetry, masses, or scars and " thyroid normal to palpation  RESP: lungs clear to auscultation - no rales, rhonchi or wheezes  BREAST: normal without masses, tenderness or nipple discharge and no palpable axillary masses or adenopathy  CV: regular rate and rhythm, normal S1 S2, no S3 or S4, no murmur, click or rub, no peripheral edema and peripheral pulses strong  ABDOMEN: soft, nontender, no hepatosplenomegaly, no masses and bowel sounds normal  MS: no musculoskeletal defects are noted and gait is age appropriate without ataxia  SKIN: no suspicious lesions or rashes  NEURO: Normal strength and tone, sensory exam grossly normal, mentation intact and speech normal  PSYCH: mentation appears normal and affect normal/bright    ASSESSMENT / PLAN:   (Z00.00) Routine general medical examination at a health care facility  (primary encounter diagnosis)  -- immunizations utd; pneumonia today   -- discussed patient to follow up with GI for hemorrhoid issues   -- discussed testing for ADHD; since there is no symptomatic change to patient; recommended only testing if impacting daily functioning   -no further paps  -pt is moving back to [place] in April     (E10.9) Type 1 diabetes mellitus without complication (H)  -- pt with insulin pump followed by endocrine  -refills per endocrine   -due for yearly labs  Plan: lisinopril (PRINIVIL/ZESTRIL) 5 MG tablet,         Lipid panel reflex to direct LDL Fasting,         Comprehensive metabolic panel      (F41.9) Anxiety  -- unclear if pt does have adhd or if this is anxiety or combo of both  -d/w patient that her talking fast and moving a lot don't really affect her life, would not recommend treatment  -tolerating medications well and feels anxiety is reasonably controlled, no change to medications   Plan: FLUoxetine (PROZAC) 20 MG capsule, buPROPion         (WELLBUTRIN SR) 150 MG 12 hr tablet      (G43.009) Migraine without aura and without status migrainosus, not intractable  -- migraines controlled on current  treatment  -no change to medications   Plan: butalbital-acetaminophen-caffeine         (FIORICET/ESGIC) -40 MG per tablet      (N95.2) Atrophic vaginitis  -- refill medication   Plan: estradiol (ESTRACE VAGINAL) 0.1 MG/GM cream         (Z23) Need for prophylactic vaccination against Streptococcus pneumoniae (pneumococcus)  -- received pcv 23 before age 65 (2015) due to DM  -now that she is 67 will do PCV 13, will need repeat pcv 23 in 2020  Plan: Pneumococcal vaccine 13 valent PCV13 IM         (Prevnar) [03374], ADMIN: Vaccine, Initial         (94818)      (Z87.891) History of tobacco use   -- discussed lung cancer screening with patient  -- patient meets criteria for low does screening   -- explained to patient this is a yearly screening   -- schedule screening   Plan: Prof fee: Shared Decisionmaking for Lung Cancer        Screening, CT Chest Lung Cancer Scrn Low Dose         woPoncho for Smoking Cessation Study (PLUTO) to        Contact Patient, CT Chest Lung Cancer Scrn Low         Dose wo        (Z78.0) Asymptomatic menopausal state  -- has been on steroids recurrently in the past  -post menopausal, petite.   Plan: DX Hip/Pelvis/Spine      (B37.3) Vaginal yeast infection  -- uses scheduled due to recurrent yeast infections  Plan: fluconazole (DIFLUCAN) 150 MG tablet    Follow up for annual care or as needed     End of Life Planning:  Patient currently has an advanced directive: Yes.  Practitioner is supportive of decision.    COUNSELING:  Reviewed preventive health counseling, as reflected in patient instructions       Regular exercise       Healthy diet/nutrition       Vision screening       Hearing screening       Dental care       Immunizations    Vaccinated for: Pneumococcal           Osteoporosis Prevention/Bone Health       Consider lung cancer screening for ages 55-80 years and 30 pack-year smoking history        Colon cancer screening          Estimated body mass index is 19.97 kg/(m^2) as  "calculated from the following:    Height as of this encounter: 5' 5\" (1.651 m).    Weight as of this encounter: 120 lb (54.4 kg).     reports that she has quit smoking. She has never used smokeless tobacco.  Tobacco Cessation Action Plan: Information offered: Patient not interested at this time    Appropriate preventive services were discussed with this patient, including applicable screening as appropriate for cardiovascular disease, diabetes, osteopenia/osteoporosis, and glaucoma.  As appropriate for age/gender, discussed screening for colorectal cancer, prostate cancer, breast cancer, and cervical cancer. Checklist reviewing preventive services available has been given to the patient.    Reviewed patients plan of care and provided an AVS. The Basic Care Plan (routine screening as documented in Health Maintenance) for Patricia meets the Care Plan requirement. This Care Plan has been established and reviewed with the Patient.    Counseling Resources:  ATP IV Guidelines  Pooled Cohorts Equation Calculator  Breast Cancer Risk Calculator  FRAX Risk Assessment  ICSI Preventive Guidelines  Dietary Guidelines for Americans, 2010  USDA's MyPlate  ASA Prophylaxis  Lung CA Screening    The information in this document, created by the medical scribe for me, accurately reflects the services I personally performed and the decisions made by me. I have reviewed and approved this document for accuracy prior to leaving the patient care area.  Jenniffer Molina MD  [facility] JOANN  Answers for HPI/ROS submitted by the patient on 1/8/2018   PHQ-2 Score: 0    "

## 2018-01-11 NOTE — PROGRESS NOTES
Lung Cancer Screening Shared Decision Making Visit     Patricia Gage is eligible for lung cancer screening on the basis of the information provided in my signed lung cancer screening order.     I have discussed with patient the risks and benefits of screening for lung cancer with low-dose CT.     The risks include:   radiation exposure    false positives     over-diagnosis    The benefit of early detection of lung cancer is contingent upon adherence to annual screening or more frequent follow up if indicated.     Furthermore, reaping the benefits of screening requires Patricia Gage to be willing and physically able to undergo diagnostic procedures, if indicated. Although no specific guide is available for determining severity of comorbidities, it is reasonable to withhold screening in patients who have greater mortality risk from other diseases.     We did discuss that the only way to prevent lung cancer is to not smoke. Smoking cessation assistance was offered.    I did not offer risk estimation using a calculator such as this one:    ShouldIScreen

## 2018-01-11 NOTE — PATIENT INSTRUCTIONS
Schedule an appointment with gastroenterologist and talk with them about getting hemorrhoidectomy    Schedule DEXA (bone density) scan.         Follow up for annual care or as needed     Lung Cancer Screening   Frequently Asked Questions  If you are at high-risk for lung cancer, getting screened with low-dose computed tomography (LDCT) every year can help save your life. This handout offers answers to some of the most common questions about lung cancer screening. If you have other questions, please call [phone] ([phone]).     What is it?  Lung cancer screening uses special X-ray technology to create an image of your lung tissue. The exam is quick and easy and takes less than 10 seconds. We don t give you any medicine or use any needles. You can eat before and after the exam. You don t need to change your clothes as long as the clothing on your chest doesn t contain metal. But, you do need to be able to hold your breath for at least 6 seconds during the exam.    What is the goal of lung cancer screening?  The goal of lung cancer screening is to save lives. Many times, lung cancer is not found until a person starts having physical symptoms. Lung cancer screening can help detect lung cancer in the earliest stages when it may be easier to treat.    Who should be screened for lung cancer?  We suggest lung cancer screening for anyone who is at high-risk for lung cancer. You are in the high-risk group if you:      are between the ages of 55 and 79, and    have smoked at least 1 pack of cigarettes a day for 30 or more years, and    still smoke or have quit within the past 15 years.    However, if you have a new cough or shortness of breath, you should talk to your doctor before being screened.    Some national lung health advocacy groups also recommend screening for people ages 50 to 79 who have smoked an average of 1 pack of cigarettes a day for 20 years. They must also have at least 1 other risk factor  for lung cancer, not including exposure to secondhand smoke. Other risk factors are having had cancer in the past, emphysema, pulmonary fibrosis, COPD, a family history of lung cancer, or exposure to certain materials such as arsenic, asbestos, beryllium, cadmium, chromium, diesel fumes, nickel, radon or silica. Your care team can help you know if you have one of these risk factors.     Why does it matter if I have symptoms?  Certain symptoms can be a sign that you have a condition in your lungs that should be checked and treated by your doctor. These symptoms include fever, chest pain, a new or changing cough, shortness of breath that you have never felt before, coughing up blood or unexplained weight loss. Having any of these symptoms can greatly affect the results of lung cancer screening.       Should all smokers get an LDCT lung cancer screening exam?  It depends. Lung cancer screening is for a very specific group of men and women who have a history of heavy smoking over a long period of time (see  Who should be screened for lung cancer  above).  I am in the high-risk group, but have been diagnosed with cancer in the past. Is LDCT lung cancer screening right for me?  In some cases, you should not have LDCT lung screening, such as when your doctor is already following your cancer with CT scan studies. Your doctor will help you decide if LDCT lung screening is right for you.  Do I need to have a screening exam every year?  Yes. If you are in the high-risk group described earlier, you should get an LDCT lung cancer screening exam every year until you are 79, or are no longer willing or able to undergo screening and possible procedures to diagnose and treat lung cancer.  How effective is LDCT at preventing death from lung cancer?  Studies have shown that LDCT lung cancer screening can lower the risk of death from lung cancer by 20 percent in people who are at high-risk.  What are the risks?  There are some risks  and limitations of LDCT lung cancer screening. We want to make sure you understand the risks and benefits, so please let us know if you have any questions. Your doctor may want to talk with you more about these risks.    Radiation exposure: As with any exam that uses radiation, there is a very small increased risk of cancer. The amount of radiation in LDCT is small about the same amount a person would get from a mammogram. Your doctor orders the exam when he or she feels the potential benefits outweigh the risks.    False negatives: No test is perfect, including LDCT. It is possible that you may have a medical condition, including lung cancer, that is not found during your exam. This is called a false negative result.    False positives and more testing: LDCT very often finds something in the lung that could be cancer, but in fact is not. This is called a false positive result. False positive tests often cause anxiety. To make sure these findings are not cancer, you may need to have more tests. These tests will be done only if you give us permission. Sometimes patients need a treatment that can have side effects, such as a biopsy. For more information on false positives, see  What can I expect from the results?     Findings not related to lung cancer: Your LDCT exam also takes pictures of areas of your body next to your lungs. In a very small number of cases, the CT scan will show an abnormal finding in one of these areas, such as your kidneys, adrenal glands, liver or thyroid. This finding may not be serious, but you may need more tests. Your doctor can help you decide what other tests you may need, if any.  What can I expect from the results?  About 1 out of 4 LDCT exams will find something that may need more tests. Most of the time, these findings are lung nodules. Lung nodules are very small collections of tissue in the lung. These nodules are very common, and the vast majority more than 97 percent are not  cancer (benign). Most are normal lymph nodes or small areas of scarring from past infections.  But, if a small lung nodule is found to be cancer, the cancer can be cured more than 90 percent of the time. To know if the nodule is cancer, we may need to get more images before your next yearly screening exam. If the nodule has suspicious features (for example, it is large, has an odd shape or grows over time), we will refer you to a specialist for further testing.  Will my doctor also get the results?  Yes. Your doctor will get a copy of your results.  Is it okay to keep smoking now that there s a cancer screening exam?  No. Tobacco is one of the strongest cancer-causing agents. It causes not only lung cancer, but other cancers and cardiovascular (heart) diseases as well. The damage caused by smoking builds over time. This means that the longer you smoke, the higher your risk of disease. While it is never too late to quit, the sooner you quit, the better.  Where can I find help to quit smoking?  The best way to prevent lung cancer is to stop smoking. If you have already quit smoking, congratulations and keep it up! For help on quitting smoking, please call tic at 7-220-334-MQVR ([phone]) or the American Cancer Society at 1-491.791.7468 to find local resources near you.  One-on-one health coaching:  If you d prefer to work individually with a health care provider on tobacco cessation, we offer:      Medication Therapy Management:  Our specially trained pharmacists work closely with you and your doctor to help you quit smoking.  Call [phone] or [phone] (toll free).     Can Do: Health coaching offered by [facility].  [email or web address]-do-[email or web address]    Preventive Health Recommendations    Female Ages 65 +    Yearly exam:     See your health care provider every year in order to  o Review health changes.   o Discuss preventive care.    o Review your medicines if your doctor has prescribed any.      You  no longer need a yearly Pap test unless you've had an abnormal Pap test in the past 10 years. If you have vaginal symptoms, such as bleeding or discharge, be sure to talk with your provider about a Pap test.      Every 1 to 2 years, have a mammogram.  If you are over 69, talk with your health care provider about whether or not you want to continue having screening mammograms.      Every 10 years, have a colonoscopy. Or, have a yearly FIT test (stool test). These exams will check for colon cancer.       Have a cholesterol test every 5 years, or more often if your doctor advises it.       Have a diabetes test (fasting glucose) every three years. If you are at risk for diabetes, you should have this test more often.       At age 65, have a bone density scan (DEXA) to check for osteoporosis (brittle bone disease).    Shots:    Get a flu shot each year.    Get a tetanus shot every 10 years.    Talk to your doctor about your pneumonia vaccines. There are now two you should receive - Pneumovax (PPSV 23) and Prevnar (PCV 13).    Talk to your doctor about the shingles vaccine.    Talk to your doctor about the hepatitis B vaccine.    Nutrition:     Eat at least 5 servings of fruits and vegetables each day.      Eat whole-grain bread, whole-wheat pasta and brown rice instead of white grains and rice.      Talk to your provider about Calcium and Vitamin D.     Lifestyle    Exercise at least 150 minutes a week (30 minutes a day, 5 days a week). This will help you control your weight and prevent disease.      Limit alcohol to one drink per day.      No smoking.       Wear sunscreen to prevent skin cancer.       See your dentist twice a year for an exam and cleaning.      See your eye doctor every 1 to 2 years to screen for conditions such as glaucoma, macular degeneration and cataracts.

## 2018-01-12 ENCOUNTER — MYC MEDICAL ADVICE (OUTPATIENT)
Dept: ENDOCRINOLOGY | Facility: CLINIC | Age: 68
End: 2018-01-12

## 2018-01-17 ENCOUNTER — HOSPITAL ENCOUNTER (OUTPATIENT)
Dept: CT IMAGING | Facility: CLINIC | Age: 68
Discharge: HOME | End: 2018-01-17
Attending: INTERNAL MEDICINE | Admitting: INTERNAL MEDICINE
Payer: COMMERCIAL

## 2018-01-17 ENCOUNTER — MYC MEDICAL ADVICE (OUTPATIENT)
Dept: PEDIATRICS | Facility: CLINIC | Age: 68
End: 2018-01-17

## 2018-01-17 ENCOUNTER — TELEPHONE (OUTPATIENT)
Dept: PEDIATRICS | Facility: CLINIC | Age: 68
End: 2018-01-17

## 2018-01-17 DIAGNOSIS — Z87.891 HISTORY OF TOBACCO USE: ICD-10-CM

## 2018-01-17 PROCEDURE — G0297 LDCT FOR LUNG CA SCREEN: HCPCS

## 2018-01-17 NOTE — TELEPHONE ENCOUNTER
Prior Authorization Retail Medication Request  Medication/Dose:   Diagnosis and ICD code: e10.9 type 1 DM   New/Renewal/Insurance Change PA: New  Previously Tried and Failed Therapies: n/a    Insurance ID (if provided): [id number]  Insurance Phone (if provided): [phone]     Any additional info from fax request:     If you received a fax notification from an outside Pharmacy:  Pharmacy Name:[facility]  Pharmacy #:[phone]  Pharmacy Fax:[phone]

## 2018-01-18 NOTE — TELEPHONE ENCOUNTER
Central Prior Authorization Team   Phone: [phone]    PA Initiation    Medication: blood glucose monitoring (SUNIL CONTOUR NEXT)   Insurance Company: [employer] - Phone [phone] Fax [phone]  Pharmacy Filling the Rx: [facility] - [facility] [address] [address] [facility]  Filling Pharmacy Phone: [phone]  Filling Pharmacy Fax: [phone]  Start Date: 1/18/2018

## 2018-01-18 NOTE — TELEPHONE ENCOUNTER
Lanny Elaine        2:47 PM   Note      Prior Authorization Retail Medication Request  Medication/Dose:   Diagnosis and ICD code: e10.9 type 1 DM   New/Renewal/Insurance Change PA: New  Previously Tried and Failed Therapies: n/a     Insurance ID (if provided): [id number]  Insurance Phone (if provided): [phone]      Any additional info from fax request:      If you received a fax notification from an outside Pharmacy:  Pharmacy Name:[facility]  Pharmacy #:[phone]  Pharmacy Fax:[phone]

## 2018-01-23 ENCOUNTER — TELEPHONE (OUTPATIENT)
Dept: ENDOCRINOLOGY | Facility: CLINIC | Age: 68
End: 2018-01-23

## 2018-01-23 NOTE — TELEPHONE ENCOUNTER
Forms/paperwork reviewed, completed and signed.  Please fax or send the papers as requested, document in chart and close the encounter.    Thank you.    Tania Lang

## 2018-01-23 NOTE — TELEPHONE ENCOUNTER
Prior Authorization Approval    Authorization Effective Date: 1/22/2018  Authorization Expiration Date: 1/22/2019  Medication: SUNIL CONTOUR NEXT-APPROVED  Approved Dose/Quantity:    Reference #:     Insurance Company: [employer] - Phone [phone] Fax [phone]  Expected CoPay: UNKNOWN     CoPay Card Available:      Foundation Assistance Needed:    Which Pharmacy is filling the prescription (Not needed for infusion/clinic administered): [facility] - [place], [place] - [address] [address]  Pharmacy Notified: Yes  Patient Notified: Yes

## 2018-01-24 ENCOUNTER — MYC MEDICAL ADVICE (OUTPATIENT)
Dept: PEDIATRICS | Facility: CLINIC | Age: 68
End: 2018-01-24

## 2018-01-27 DIAGNOSIS — E10.9 TYPE 1 DIABETES MELLITUS WITHOUT COMPLICATION (H): ICD-10-CM

## 2018-01-27 LAB
ALBUMIN SERPL-MCNC: 3.6 G/DL (ref 3.4–5)
ALP SERPL-CCNC: 76 U/L (ref 40–150)
ALT SERPL W P-5'-P-CCNC: 19 U/L (ref 0–50)
ANION GAP SERPL CALCULATED.3IONS-SCNC: 7 MMOL/L (ref 3–14)
AST SERPL W P-5'-P-CCNC: 19 U/L (ref 0–45)
BILIRUB SERPL-MCNC: 0.6 MG/DL (ref 0.2–1.3)
BUN SERPL-MCNC: 15 MG/DL (ref 7–30)
CALCIUM SERPL-MCNC: 8.7 MG/DL (ref 8.5–10.1)
CHLORIDE SERPL-SCNC: 104 MMOL/L (ref 94–109)
CHOLEST SERPL-MCNC: 183 MG/DL
CO2 SERPL-SCNC: 28 MMOL/L (ref 20–32)
CREAT SERPL-MCNC: 0.54 MG/DL (ref 0.52–1.04)
GFR SERPL CREATININE-BSD FRML MDRD: >90 ML/MIN/1.7M2
GLUCOSE SERPL-MCNC: 137 MG/DL (ref 70–99)
HDLC SERPL-MCNC: 72 MG/DL
LDLC SERPL CALC-MCNC: 96 MG/DL
NONHDLC SERPL-MCNC: 111 MG/DL
POTASSIUM SERPL-SCNC: 3.9 MMOL/L (ref 3.4–5.3)
PROT SERPL-MCNC: 6.9 G/DL (ref 6.8–8.8)
SODIUM SERPL-SCNC: 139 MMOL/L (ref 133–144)
TRIGL SERPL-MCNC: 77 MG/DL

## 2018-01-27 PROCEDURE — 80061 LIPID PANEL: CPT | Performed by: INTERNAL MEDICINE

## 2018-01-27 PROCEDURE — 80053 COMPREHEN METABOLIC PANEL: CPT | Performed by: INTERNAL MEDICINE

## 2018-01-27 PROCEDURE — 36415 COLL VENOUS BLD VENIPUNCTURE: CPT | Performed by: INTERNAL MEDICINE

## 2018-02-06 ENCOUNTER — OFFICE VISIT (OUTPATIENT)
Dept: DERMATOLOGY | Facility: CLINIC | Age: 68
End: 2018-02-06
Payer: COMMERCIAL

## 2018-02-06 DIAGNOSIS — L57.0 AK (ACTINIC KERATOSIS): ICD-10-CM

## 2018-02-06 DIAGNOSIS — L30.0 NUMMULAR DERMATITIS: Primary | ICD-10-CM

## 2018-02-06 PROCEDURE — 99214 OFFICE O/P EST MOD 30 MIN: CPT | Performed by: DERMATOLOGY

## 2018-02-06 RX ORDER — FLUOCINONIDE 0.5 MG/G
OINTMENT TOPICAL
Qty: 60 G | Refills: 2 | Status: SHIPPED | OUTPATIENT
Start: 2018-02-06

## 2018-02-06 RX ORDER — FLUOROURACIL 50 MG/G
CREAM TOPICAL
Qty: 40 G | Refills: 0 | Status: SHIPPED | OUTPATIENT
Start: 2018-02-06

## 2018-02-06 NOTE — MR AVS SNAPSHOT
After Visit Summary   2/6/2018    Patricia Gage    MRN: [id number]           Patient Information     Date Of Birth          1950        Visit Information        Provider Department      2/6/2018 9:00 AM Emiliana Doshi MD [facility]        Today's Diagnoses     Nummular dermatitis    -  1    AK (actinic keratosis)          Care Instructions                    Pediatric Dermatology  [facility]  [address] [facility]  1st [place]  [address]  Phone: [phone]    Pediatric & Adult Dermatology  [facility]  [address]  2nd Floor  [facility] [address]  [phone]                  General information: Dr. Emiliana Doshi is a board-certified dermatologist with subspecialty certification in pediatric dermatology.     Scheduling and Nurse Triage: Dr. Doshi sees pediatric patients on Mondays in [place] and adult and pediatric patients on Tuesdays in [place]. The remainder of the week she practices at the [facility]. Please call the above phone numbers to schedule or to talk to a nurse.     -For scheduling at the [place] or [place] locations, or to talk to the triage nurse please call the above phone number at the clinic where you were seen.     -For medication refills, please call your pharmacy.                     Follow-ups after your visit        Your next 10 appointments already scheduled     Mar 05, 2018  4:00 PM CST   Return Visit with Tania Lang MD   [facility] ([facility]    [address] [place] [address] [address]  [facility] [address] [phone]              Who to contact     If you have questions or need follow up information about today's clinic visit or your schedule please contact [facility] [place] directly at [phone].  Normal or non-critical lab and imaging results will be communicated  to you by [employer], letter or phone within 4 business days after the clinic has received the results. If you do not hear from us within 7 days, please contact the clinic through [employer] or phone. If you have a critical or abnormal lab result, we will notify you by phone as soon as possible.  Submit refill requests through [employer] or call your pharmacy and they will forward the refill request to us. Please allow 3 business days for your refill to be completed.          Additional Information About Your Visit        [employer] Information     [employer] gives you secure access to your electronic health record. If you see a primary care provider, you can also send messages to your care team and make appointments. If you have questions, please call your primary care clinic.  If you do not have a primary care provider, please call [phone] and they will assist you.        Care EveryWhere ID     This is your Care EveryWhere ID. This could be used by other organizations to access your [facility] medical records  [id number]         Blood Pressure from Last 3 Encounters:   01/11/18 118/62   12/05/17 124/64   09/05/17 112/64    Weight from Last 3 Encounters:   01/11/18 120 lb (54.4 kg)   12/05/17 121 lb 1.6 oz (54.9 kg)   09/05/17 121 lb 8 oz (55.1 kg)              Today, you had the following     No orders found for display         Today's Medication Changes          These changes are accurate as of 2/6/18  9:42 AM.  If you have any questions, ask your nurse or doctor.               Start taking these medicines.        Dose/Directions    fluocinonide 0.05 % ointment   Commonly known as:  LIDEX   Used for:  Nummular dermatitis   Started by:  Emiliana Doshi MD        To hand and leg rash twice daily until clear, then as needed.   Quantity:  60 g   Refills:  2       fluorouracil 5 % cream   Commonly known as:  EFUDEX   Used for:  AK (actinic keratosis)   Started by:  Emiliana Doshi MD        Nightly to red spot on  the cheek for 3 weeks.   Quantity:  40 g   Refills:  0            Where to get your medicines      These medications were sent to [id number] - [place], [place] - [address] AT [facility] [address]  [address], [place] [address]     Phone:  [phone]     fluocinonide 0.05 % ointment    fluorouracil 5 % cream                Primary Care Provider Office Phone # Fax #    Jenniffer Molina [phone] [address] [place] [address]        Equal Access to Services     [facility]: Hadii aad ku hadasho Soomaali, waaxda luqadaha, qaybta kaalmada adeegyada, nkechi romano hayronnie johnson . So [facility] [phone].    ATENCIÓN: Si habla español, tiene a bazzi disposición servicios gratuitos de asistencia lingüística. [facility] [phone].    We comply with applicable federal civil rights laws and [place] laws. We do not discriminate on the basis of race, color, national origin, age, disability, sex, sexual orientation, or gender identity.            Thank you!     Thank you for choosing [facility]  for your care. Our goal is always to provide you with excellent care. Hearing back from our patients is one way we can continue to improve our services. Please take a few minutes to complete the written survey that you may receive in the mail after your visit with us. Thank you!             Your Updated Medication List - Protect others around you: Learn how to safely use, store and throw away your medicines at www.disposemymeds.org.          This list is accurate as of 2/6/18  9:42 AM.  Always use your most recent med list.                   Brand Name Dispense Instructions for use Diagnosis    * ASACOL  MG EC tablet   Generic drug:  mesalamine     180 tablet    3 tablets twice daily        * CANASA 1000 MG Suppository   Generic drug:  mesalamine     30 suppository    Place 1 suppository (1,000 mg) rectally At Bedtime        aspirin  81 MG EC tablet     90 tablet    Take 1 tablet by mouth daily.    DM (diabetes mellitus), type 1 (H)       * blood glucose monitoring test strip    no brand specified    300 each    Please dispense glucometer compatible strips. Check 8 times a day    Type 1 diabetes mellitus without complication (H)       * blood glucose monitoring test strip    no brand specified    300 strip    Use to test blood sugars 7 times daily or as directed    Type 1 diabetes mellitus without complication (H)       * blood glucose monitoring test strip    SUNIL CONTOUR NEXT    900 strip    CHECK  8  TIMES DAILY    Type 1 diabetes mellitus without complication (H)       buPROPion 150 MG 12 hr tablet    WELLBUTRIN SR    180 tablet    Take 1 tablet (150 mg) by mouth 2 times daily    Anxiety       butalbital-acetaminophen-caffeine -40 MG per tablet    FIORICET/ESGIC    30 tablet    Take 1 tablet by mouth every 4 hours as needed    Migraine without aura and without status migrainosus, not intractable       estradiol 0.1 MG/GM cream    ESTRACE VAGINAL    30 g    Place 2 g vaginally twice a week    Atrophic vaginitis       fluconazole 150 MG tablet    DIFLUCAN    12 tablet    Take 1 tablet (150 mg) by mouth every 7 days    Vaginal yeast infection       fluocinonide 0.05 % ointment    LIDEX    60 g    To hand and leg rash twice daily until clear, then as needed.    Nummular dermatitis       fluorouracil 5 % cream    EFUDEX    40 g    Nightly to red spot on the cheek for 3 weeks.    AK (actinic keratosis)       FLUoxetine 20 MG capsule    PROzac    90 capsule    Take 1 capsule (20 mg) by mouth daily    Anxiety       insulin aspart 100 UNITS/ML injection    NovoLOG VIAL    30 mL    INJECT 35 UNITS SUBCUTANEOUSLY DAILY VIA INSULIN PUMP    Type 1 diabetes mellitus without complication (H)       insulin infusion pump device      100 units/carb unit. 50 units SQ continuous        lisinopril 5 MG tablet    PRINIVIL/ZESTRIL    45 tablet    Take 0.5  tablets (2.5 mg) by mouth daily    Type 1 diabetes mellitus without complication (H)       MINIMED RESERVOIR 1.8ML Misc     30 each    1 each daily as needed Change insulin reservoir, set and site every 2-3 days    Type 1 diabetes mellitus without complication (H)       nicotine 14 MG/24HR 24 hr patch    NICODERM CQ    30 patch    Place 1 patch onto the skin every 24 hours    Tobacco abuse       triamcinolone 0.1 % ointment    KENALOG    80 g    To hand rash twice daily as needed.    Dermatitis       * Notice:  This list has 5 medication(s) that are the same as other medications prescribed for you. Read the directions carefully, and ask your doctor or other care provider to review them with you.

## 2018-02-06 NOTE — PROGRESS NOTES
Dermatology Clinic Note    Dermatology Problem List:  1. Seborrheic keratoses  2. Solar lentigines  3. History of Basal cell carcinoma on the R lower cheek, removed at [facility]  4. Contact dermatitis of the hands  5. Actinic keratosis r mid cheek- efudex on 2/6/18   6. Nummular dermatitis legs- lidex      CC: Patient presents with:  RECHECK: has a spot on left cheek; very dry skin - has been there since before last OV      HPI: Patricia Gage is a 67 year old female presenting for reevaluation of hand rash. Seen in 6/17 and given prescription for triamcinolone ointment and recommended to avoid softsoap. Patient does not recall getting prescription. Now has itching circular rash on the legs and a red spot on the L central cheek that does not scale.       Patient Active Problem List   Diagnosis     Left sided ulcerative (chronic) colitis (H)     CARDIOVASCULAR SCREENING; LDL GOAL LESS THAN 100     Advanced directives, counseling/discussion     Anxiety     Migraine     Menopause     Long term current use of insulin (H)     Tobacco abuse     Hx of basal cell carcinoma     Inflamed seborrheic keratosis     Seborrheic keratosis     Solar lentiginosis     Skin cancer screening     Cherry angioma     Type 1 diabetes mellitus without complication (H)     Insulin pump in place       No Known Allergies      Current Outpatient Prescriptions   Medication     fluocinonide (LIDEX) 0.05 % ointment     fluorouracil (EFUDEX) 5 % cream     FLUoxetine (PROZAC) 20 MG capsule     lisinopril (PRINIVIL/ZESTRIL) 5 MG tablet     buPROPion (WELLBUTRIN SR) 150 MG 12 hr tablet     mesalamine (CANASA) 1000 MG Suppository     butalbital-acetaminophen-caffeine (FIORICET/ESGIC) -40 MG per tablet     estradiol (ESTRACE VAGINAL) 0.1 MG/GM cream     fluconazole (DIFLUCAN) 150 MG tablet     insulin aspart (NOVOLOG VIAL) 100 UNITS/ML injection     blood glucose monitoring (SUNIL CONTOUR NEXT) test strip     nicotine (NICODERM CQ) 14 MG/24HR  24 hr patch     blood glucose monitoring (NO BRAND SPECIFIED) test strip     Insulin Infusion Pump Supplies (MINIMED RESERVOIR 1.8ML) MISC     blood glucose monitoring (NO BRAND SPECIFIED) test strip     mesalamine (ASACOL HD) 800 MG EC tablet     Insulin Infusion Pump (PARADIGM REVEL INSULIN PUMP) LINH     aspirin 81 MG EC tablet     triamcinolone (KENALOG) 0.1 % ointment     No current facility-administered medications for this visit.        Family History   Problem Relation Age of Onset     Cardiovascular Father      father  of heart failure     Other - See Comments Sister      Tourettes     GASTROINTESTINAL DISEASE Sister      Celiac Sprue     CANCER No family hx of      no skin cancer       Social History     Social History     Marital status:      Spouse name: N/A     Number of children: N/A     Years of education: N/A     Occupational History     Not on file.     Social History Main Topics     Smoking status: Former Smoker     ROS: Feeling well without other skin concerns.     EXAM:  There were no vitals taken for this visit.  GEN: Alert, no distress  HEENT: Conjunctiva clear.   PULM: Breathing comfortably on RA  CV: Extrem warm and well perfused  ABD: No distension  SKIN: Exam of the face, neck, hands, lower legs Normal except as follows:  --L central cheek with 4 mm pink macule with overlying telangiectasia  --L lower leg with circular 2 cm scaling plaques  --Scattered brown 3-5 mm macules on the lower legs  --Hands with xerosis and erythema as well as dry scale.       Assessment and Plan:    1. Dermatitis of hands: Allergic contact vs more likely irritant dermatitis. Advised transition to a gentle cleanser and use of lidex ointment BID as needed to the affected area.     2. Nummular dermatitis of the legs: Recommended ongoing gentle skin cares with mild soap, already using Eucerin moisturizer. BID lidex ointment until clear, then as needed.     3. Red macule on the L cheek: Differential  diagnosis of telangiectasia vs early actinic keratosis. Recommended trial of efudex nightly x3 weeks which would cause redness, irritation and clearance if early actinic keratosis.     RTC in 1 year, sooner if needed.       Thank you for involving me in this patient's care.     Emiliana Doshi MD  Dermatology Staff    CC:     Jenniffer Molina MD

## 2018-02-06 NOTE — LETTER
2/6/2018      RE: Patricia Gage  [address]  [address]  [place] [address]       Dermatology Clinic Note    Dermatology Problem List:  1. Seborrheic keratoses  2. Solar lentigines  3. History of Basal cell carcinoma on the R lower cheek, removed at [facility]  4. Contact dermatitis of the hands  5. Actinic keratosis r mid cheek- efudex on 2/6/18   6. Nummular dermatitis legs- lidex      CC: Patient presents with:  RECHECK: has a spot on left cheek; very dry skin - has been there since before last OV      HPI: Patricia Gage is a 67 year old female presenting for reevaluation of hand rash. Seen in 6/17 and given prescription for triamcinolone ointment and recommended to avoid softsoap. Patient does not recall getting prescription. Now has itching circular rash on the legs and a red spot on the L central cheek that does not scale.       Patient Active Problem List   Diagnosis     Left sided ulcerative (chronic) colitis (H)     CARDIOVASCULAR SCREENING; LDL GOAL LESS THAN 100     Advanced directives, counseling/discussion     Anxiety     Migraine     Menopause     Long term current use of insulin (H)     Tobacco abuse     Hx of basal cell carcinoma     Inflamed seborrheic keratosis     Seborrheic keratosis     Solar lentiginosis     Skin cancer screening     Cherry angioma     Type 1 diabetes mellitus without complication (H)     Insulin pump in place       No Known Allergies      Current Outpatient Prescriptions   Medication     fluocinonide (LIDEX) 0.05 % ointment     fluorouracil (EFUDEX) 5 % cream     FLUoxetine (PROZAC) 20 MG capsule     lisinopril (PRINIVIL/ZESTRIL) 5 MG tablet     buPROPion (WELLBUTRIN SR) 150 MG 12 hr tablet     mesalamine (CANASA) 1000 MG Suppository     butalbital-acetaminophen-caffeine (FIORICET/ESGIC) -40 MG per tablet     estradiol (ESTRACE VAGINAL) 0.1 MG/GM cream     fluconazole (DIFLUCAN) 150 MG tablet     insulin aspart (NOVOLOG VIAL) 100 UNITS/ML injection      blood glucose monitoring (SUNIL CONTOUR NEXT) test strip     nicotine (NICODERM CQ) 14 MG/24HR 24 hr patch     blood glucose monitoring (NO BRAND SPECIFIED) test strip     Insulin Infusion Pump Supplies (MINIMED RESERVOIR 1.8ML) MISC     blood glucose monitoring (NO BRAND SPECIFIED) test strip     mesalamine (ASACOL HD) 800 MG EC tablet     Insulin Infusion Pump (PARADIGM REVEL INSULIN PUMP) LINH     aspirin 81 MG EC tablet     triamcinolone (KENALOG) 0.1 % ointment     No current facility-administered medications for this visit.        Family History   Problem Relation Age of Onset     Cardiovascular Father      father  of heart failure     Other - See Comments Sister      Tourettes     GASTROINTESTINAL DISEASE Sister      Celiac Sprue     CANCER No family hx of      no skin cancer       Social History     Social History     Marital status:      Spouse name: N/A     Number of children: N/A     Years of education: N/A     Occupational History     Not on file.     Social History Main Topics     Smoking status: Former Smoker     ROS: Feeling well without other skin concerns.     EXAM:  There were no vitals taken for this visit.  GEN: Alert, no distress  HEENT: Conjunctiva clear.   PULM: Breathing comfortably on RA  CV: Extrem warm and well perfused  ABD: No distension  SKIN: Exam of the face, neck, hands, lower legs Normal except as follows:  --L central cheek with 4 mm pink macule with overlying telangiectasia  --L lower leg with circular 2 cm scaling plaques  --Scattered brown 3-5 mm macules on the lower legs  --Hands with xerosis and erythema as well as dry scale.       Assessment and Plan:    1. Dermatitis of hands: Allergic contact vs more likely irritant dermatitis. Advised transition to a gentle cleanser and use of lidex ointment BID as needed to the affected area.     2. Nummular dermatitis of the legs: Recommended ongoing gentle skin cares with mild soap, already using Eucerin moisturizer. BID  lidex ointment until clear, then as needed.     3. Red macule on the L cheek: Differential diagnosis of telangiectasia vs early actinic keratosis. Recommended trial of efudex nightly x3 weeks which would cause redness, irritation and clearance if early actinic keratosis.     RTC in 1 year, sooner if needed.       Thank you for involving me in this patient's care.     Emiliana Doshi MD  Dermatology Staff    CC:     Jenniffer Molina MD

## 2018-02-06 NOTE — PATIENT INSTRUCTIONS
Pediatric Dermatology  [facility]  [address]  1st [place]  [address]  Phone: [phone]    Pediatric & Adult Dermatology  [facility]  [address]   2nd Floor  [facility] [address]  [phone]                  General information: Dr. Emiliana Doshi is a board-certified dermatologist with subspecialty certification in pediatric dermatology.     Scheduling and Nurse Triage: Dr. Doshi sees pediatric patients on Mondays in [place] and adult and pediatric patients on Tuesdays in [place]. The remainder of the week she practices at the [facility]. Please call the above phone numbers to schedule or to talk to a nurse.     -For scheduling at the [place] or [place] locations, or to talk to the triage nurse please call the above phone number at the clinic where you were seen.     -For medication refills, please call your pharmacy.

## 2018-02-09 DIAGNOSIS — L30.9 DERMATITIS: Primary | ICD-10-CM

## 2018-02-09 RX ORDER — MOMETASONE FUROATE 1 MG/G
OINTMENT TOPICAL
Qty: 45 G | Refills: 3 | Status: SHIPPED | OUTPATIENT
Start: 2018-02-09

## 2018-02-09 RX ORDER — BETAMETHASONE DIPROPIONATE 0.5 MG/G
OINTMENT, AUGMENTED TOPICAL
Qty: 45 G | Refills: 3 | Status: SHIPPED | OUTPATIENT
Start: 2018-02-09

## 2018-02-22 ENCOUNTER — MYC MEDICAL ADVICE (OUTPATIENT)
Dept: PEDIATRICS | Facility: CLINIC | Age: 68
End: 2018-02-22

## 2018-03-13 ENCOUNTER — RADIANT APPOINTMENT (OUTPATIENT)
Dept: BONE DENSITY | Facility: CLINIC | Age: 68
End: 2018-03-13
Attending: INTERNAL MEDICINE
Payer: COMMERCIAL

## 2018-03-13 DIAGNOSIS — Z78.0 ASYMPTOMATIC MENOPAUSAL STATE: ICD-10-CM

## 2018-03-13 PROCEDURE — 77080 DXA BONE DENSITY AXIAL: CPT | Performed by: RADIOLOGY

## 2018-04-25 ENCOUNTER — MYC MEDICAL ADVICE (OUTPATIENT)
Dept: ENDOCRINOLOGY | Facility: CLINIC | Age: 68
End: 2018-04-25

## 2018-04-25 ENCOUNTER — MYC MEDICAL ADVICE (OUTPATIENT)
Dept: PEDIATRICS | Facility: CLINIC | Age: 68
End: 2018-04-25

## 2018-04-26 NOTE — TELEPHONE ENCOUNTER
Faxed labs to [facility] and sent [employer] message to patient informing her.

## 2018-06-08 ENCOUNTER — TELEPHONE (OUTPATIENT)
Dept: PEDIATRICS | Facility: CLINIC | Age: 68
End: 2018-06-08

## 2018-06-08 NOTE — TELEPHONE ENCOUNTER
Panel Management Review      Patient has the following on her problem list:     Diabetes    ASA: Passed    Last A1C  Lab Results   Component Value Date    A1C 7.5 12/05/2017    A1C 7.1 05/08/2017    A1C 6.9 12/29/2016    A1C 7.5 11/14/2016    A1C 7.6 04/18/2016     A1C tested: Passed    Last LDL:    Lab Results   Component Value Date    CHOL 183 01/27/2018     Lab Results   Component Value Date    HDL 72 01/27/2018     Lab Results   Component Value Date    LDL 96 01/27/2018     Lab Results   Component Value Date    TRIG 77 01/27/2018     Lab Results   Component Value Date    CHOLHDLRATIO 2.4 01/07/2015     Lab Results   Component Value Date    NHDL 111 01/27/2018       Is the patient on a Statin? NO             Is the patient on Aspirin? YES    Medications     Salicylates    aspirin 81 MG EC tablet          Last three blood pressure readings:  BP Readings from Last 3 Encounters:   01/11/18 118/62   12/05/17 124/64   09/05/17 112/64       Date of last diabetes office visit: 12/05/2017     Tobacco History:     History   Smoking Status     Current Every Day Smoker     Packs/day: 1.00     Years: 30.00     Types: Cigarettes   Smokeless Tobacco     Never Used           Composite cancer screening  Chart review shows that this patient is due/due soon for the following None  Summary:    Patient is due/failing the following:   None    Action needed:   None    Type of outreach:    None    Questions for provider review:    None                                                                                                                                    Jenna Guzman CMA (AAMA)        Chart routed to Care Team .

## 2018-09-18 ENCOUNTER — HEALTH MAINTENANCE LETTER (OUTPATIENT)
Age: 68
End: 2018-09-18

## 2020-03-02 ENCOUNTER — HEALTH MAINTENANCE LETTER (OUTPATIENT)
Age: 70
End: 2020-03-02

## 2020-12-14 ENCOUNTER — HEALTH MAINTENANCE LETTER (OUTPATIENT)
Age: 70
End: 2020-12-14

## 2021-04-18 ENCOUNTER — HEALTH MAINTENANCE LETTER (OUTPATIENT)
Age: 71
End: 2021-04-18

## 2021-08-07 ENCOUNTER — HEALTH MAINTENANCE LETTER (OUTPATIENT)
Age: 71
End: 2021-08-07

## 2021-10-02 ENCOUNTER — HEALTH MAINTENANCE LETTER (OUTPATIENT)
Age: 71
End: 2021-10-02

## 2022-03-19 ENCOUNTER — HEALTH MAINTENANCE LETTER (OUTPATIENT)
Age: 72
End: 2022-03-19

## 2022-05-14 ENCOUNTER — HEALTH MAINTENANCE LETTER (OUTPATIENT)
Age: 72
End: 2022-05-14

## 2022-09-03 ENCOUNTER — HEALTH MAINTENANCE LETTER (OUTPATIENT)
Age: 72
End: 2022-09-03

## 2023-01-15 ENCOUNTER — HEALTH MAINTENANCE LETTER (OUTPATIENT)
Age: 73
End: 2023-01-15

## 2023-06-03 ENCOUNTER — HEALTH MAINTENANCE LETTER (OUTPATIENT)
Age: 73
End: 2023-06-03

## 2023-07-22 ENCOUNTER — HEALTH MAINTENANCE LETTER (OUTPATIENT)
Age: 73
End: 2023-07-22

## 2024-02-17 ENCOUNTER — HEALTH MAINTENANCE LETTER (OUTPATIENT)
Age: 74
End: 2024-02-17